# Patient Record
Sex: MALE | Race: WHITE | NOT HISPANIC OR LATINO | Employment: FULL TIME | URBAN - METROPOLITAN AREA
[De-identification: names, ages, dates, MRNs, and addresses within clinical notes are randomized per-mention and may not be internally consistent; named-entity substitution may affect disease eponyms.]

---

## 2018-10-30 ENCOUNTER — DOCUMENTATION (OUTPATIENT)
Dept: NEUROSURGERY | Facility: CLINIC | Age: 38
End: 2018-10-30

## 2018-10-31 DIAGNOSIS — M54.16 LUMBAR RADICULOPATHY: Primary | ICD-10-CM

## 2018-11-05 ENCOUNTER — TELEPHONE (OUTPATIENT)
Dept: NEUROSURGERY | Facility: CLINIC | Age: 38
End: 2018-11-05

## 2018-11-05 NOTE — TELEPHONE ENCOUNTER
Faxed patient's signed medical release of health information paper to [facility]'s medical records department to [phone] on 11/1/18  Called again today because we still have not received patient's records  Spoke with Lanie Willard who said that they did receive the fax on 11/1/18 and she is going to try to expedite the records so that we can obtain them by the end of the day today for patient's appt tomorrow  Awaiting fax

## 2018-11-06 ENCOUNTER — OFFICE VISIT (OUTPATIENT)
Dept: NEUROSURGERY | Facility: CLINIC | Age: 38
End: 2018-11-06
Payer: COMMERCIAL

## 2018-11-06 VITALS
WEIGHT: 195.8 LBS | RESPIRATION RATE: 16 BRPM | BODY MASS INDEX: 27.41 KG/M2 | SYSTOLIC BLOOD PRESSURE: 132 MMHG | HEIGHT: 71 IN | DIASTOLIC BLOOD PRESSURE: 80 MMHG | TEMPERATURE: 98.7 F

## 2018-11-06 DIAGNOSIS — Z98.890 STATUS POST LUMBAR DISCECTOMY: ICD-10-CM

## 2018-11-06 DIAGNOSIS — M54.16 LUMBAR RADICULOPATHY: Primary | ICD-10-CM

## 2018-11-06 PROCEDURE — 99244 OFF/OP CNSLTJ NEW/EST MOD 40: CPT | Performed by: NEUROLOGICAL SURGERY

## 2018-11-06 RX ORDER — METHYLPREDNISOLONE 4 MG/1
TABLET ORAL
Qty: 21 TABLET | Refills: 0 | Status: SHIPPED | OUTPATIENT
Start: 2018-11-06 | End: 2018-12-04 | Stop reason: ALTCHOICE

## 2018-11-06 RX ORDER — IBUPROFEN 200 MG
400 TABLET ORAL DAILY PRN
COMMUNITY
End: 2019-04-23

## 2018-11-06 NOTE — PROGRESS NOTES
Office Note - Neurosurgery   Shannan Simmons 45 y o  male MRN: [id number]      Assessment:    Patient is gradually improving  79-year-old gentleman with left leg pain consistent with radiculopathy  He has had previous back surgery  He will begin physical therapy for core strengthening and range of motion exercises  Will refer him to Dr Jerel Land to discuss additional pain management strategies  In the interim, prescribed Medrol Dosepak  Common side effects and interactions reviewed with the patient  I asked him not to take this medication with NSAIDs  He will follow-up in approximately 1 months time with MRI of the lumbar spine with and without contrast to discuss surgical options  He will contact my office should any concerns arise in the interim  History, physical examination and diagnostic tests were reviewed and questions answered  Diagnosis, care plan and treatment options were discussed  The patient understand instructions and will follow up as directed  Plan:    Follow-up: in 1 week(s)    Problem List Items Addressed This Visit        Other    Status post lumbar discectomy    Relevant Medications    Methylprednisolone 4 MG TBPK    Other Relevant Orders    MRI lumbar spine w wo contrast    Ambulatory referral to Physical Therapy    Ambulatory referral to Pain Management      Other Visit Diagnoses     Lumbar radiculopathy    -  Primary    Relevant Medications    Methylprednisolone 4 MG TBPK    Other Relevant Orders    MRI lumbar spine w wo contrast    Ambulatory referral to Physical Therapy    Ambulatory referral to Pain Management          Subjective/Objective     Chief Complaint    Left leg pain  HPI    79-year-old gentleman who underwent microdiskectomy in 2015 for left-sided leg pain  He had complete resolution of the symptoms afterwards    In June without inciting event he began to notice recurrent symptoms of pain radiating down the back of his left leg and into the outer foot  There is associated numbness but no weakness  This became worse in October  He denies any pain, weakness or numbness in his right leg  He denies any significant lower back pain or difficulties with bowel bladder function or changing perineal sensation  Extending his back seems to exacerbate the pain  Advil and Naprosyn have been helpful with the pain  He has not recently tried physical therapy, epidural steroid injection or any other pain medication  He presents today for initial evaluation  ROS    Review of Systems   HENT: Negative  Eyes: Negative  Respiratory: Negative  Cardiovascular: Negative  Gastrointestinal: Negative  Endocrine: Negative  Genitourinary: Negative  Musculoskeletal: Positive for arthralgias  Patient reports having pressure in his tailbone region since the surgery in 2015  Pain is mainly down the left leg, same as it was prior to surgery  Skin: Negative  Allergic/Immunologic: Negative  Neurological: Positive for weakness (However, patient reports that he is able to lift his b/l legs from a supine position which he could not do prior to his last surgery  )  Hematological: Negative  Psychiatric/Behavioral: Negative  Family History    Family History   Problem Relation Age of Onset    Lumbar disc disease Father        Social History    Social History     Social History    Marital status: /Civil Union     Spouse name: N/A    Number of children: N/A    Years of education: N/A     Occupational History    Not on file       Social History Main Topics    Smoking status: Never Smoker    Smokeless tobacco: Never Used    Alcohol use Yes    Drug use: No    Sexual activity: Yes     Other Topics Concern    Not on file     Social History Narrative    No narrative on file       Past Medical History    Past Medical History:   Diagnosis Date    Low back pain     Lumbosacral disc disease        Surgical History    Past Surgical History:   Procedure Laterality Date    LUMBAR SPINE SURGERY  10/07/2015    Dr Eileen Ocasio       Medications      Current Outpatient Prescriptions:     ibuprofen (MOTRIN) 200 mg tablet, Take 400 mg by mouth daily as needed  , Disp: , Rfl:     Methylprednisolone 4 MG TBPK, Use as directed on package, Disp: 21 tablet, Rfl: 0    Allergies    No Known Allergies    The following portions of the patient's history were reviewed and updated as appropriate: allergies, current medications, past family history, past medical history, past social history, past surgical history and problem list     Physical Exam    Vitals:  Blood pressure 132/80, temperature 98 7 °F (37 1 °C), resp  rate 16, height 5' 10 5" (1 791 m), weight 88 8 kg (195 lb 12 8 oz)  ,Body mass index is 27 7 kg/m²  Physical Exam   Constitutional: He appears well-developed and well-nourished  No distress  Musculoskeletal:   Full range of motion on flexion-extension of the lumbar spine without pain  Neurological:   5/5 power in lower extremities  Deep tendon reflexes 2+ at both patella but cannot be elicited at the Achilles  Reports normal light touch and pinprick sensation lower extremities  Walks with a steady gait  Romberg negative  Skin: Skin is warm and dry  Psychiatric: He has a normal mood and affect  His behavior is normal    Vitals reviewed      Neurologic Exam

## 2018-11-06 NOTE — LETTER
November 6, 2018     Carolina Gillespie MD  [address] [place] [address] [address] [address] [facility]    Patient: Surya Hoffman   YOB: 1980   Date of Visit: 11/6/2018       Dear Dr Vikas Harper: Thank you for referring Surya Hoffman to me for evaluation  Below are my notes for this consultation  If you have questions, please do not hesitate to call me  I look forward to following your patient along with you  Sincerely,        Sofia Jackson MD        CC: No Recipients  Sofia Jackson MD  11/6/2018  2:25 PM  Sign at close encounter  Office Note - Neurosurgery   Surya Hoffman 45 y o  male MRN: [id number]      Assessment:    Patient is gradually improving  77-year-old gentleman with left leg pain consistent with radiculopathy  He has had previous back surgery  He will begin physical therapy for core strengthening and range of motion exercises  Will refer him to Dr Cheyenne Up to discuss additional pain management strategies  In the interim, prescribed Medrol Dosepak  Common side effects and interactions reviewed with the patient  I asked him not to take this medication with NSAIDs  He will follow-up in approximately 1 months time with MRI of the lumbar spine with and without contrast to discuss surgical options  He will contact my office should any concerns arise in the interim  History, physical examination and diagnostic tests were reviewed and questions answered  Diagnosis, care plan and treatment options were discussed  The patient understand instructions and will follow up as directed      Plan:    Follow-up: in 1 week(s)    Problem List Items Addressed This Visit        Other    Status post lumbar discectomy    Relevant Medications    Methylprednisolone 4 MG TBPK    Other Relevant Orders    MRI lumbar spine w wo contrast    Ambulatory referral to Physical Therapy    Ambulatory referral to Pain Management      Other Visit Diagnoses     Lumbar radiculopathy    - Primary    Relevant Medications    Methylprednisolone 4 MG TBPK    Other Relevant Orders    MRI lumbar spine w wo contrast    Ambulatory referral to Physical Therapy    Ambulatory referral to Pain Management          Subjective/Objective     Chief Complaint    Left leg pain  HPI    68-year-old gentleman who underwent microdiskectomy in 2015 for left-sided leg pain  He had complete resolution of the symptoms afterwards  In June without inciting event he began to notice recurrent symptoms of pain radiating down the back of his left leg and into the outer foot  There is associated numbness but no weakness  This became worse in October  He denies any pain, weakness or numbness in his right leg  He denies any significant lower back pain or difficulties with bowel bladder function or changing perineal sensation  Extending his back seems to exacerbate the pain  Advil and Naprosyn have been helpful with the pain  He has not recently tried physical therapy, epidural steroid injection or any other pain medication  He presents today for initial evaluation  ROS    Review of Systems   HENT: Negative  Eyes: Negative  Respiratory: Negative  Cardiovascular: Negative  Gastrointestinal: Negative  Endocrine: Negative  Genitourinary: Negative  Musculoskeletal: Positive for arthralgias  Patient reports having pressure in his tailbone region since the surgery in 2015  Pain is mainly down the left leg, same as it was prior to surgery  Skin: Negative  Allergic/Immunologic: Negative  Neurological: Positive for weakness (However, patient reports that he is able to lift his b/l legs from a supine position which he could not do prior to his last surgery  )  Hematological: Negative  Psychiatric/Behavioral: Negative          Family History    Family History   Problem Relation Age of Onset    Lumbar disc disease Father        Social History    Social History     Social History    Marital status: /Civil Union     Spouse name: N/A    Number of children: N/A    Years of education: N/A     Occupational History    Not on file  Social History Main Topics    Smoking status: Never Smoker    Smokeless tobacco: Never Used    Alcohol use Yes    Drug use: No    Sexual activity: Yes     Other Topics Concern    Not on file     Social History Narrative    No narrative on file       Past Medical History    Past Medical History:   Diagnosis Date    Low back pain     Lumbosacral disc disease        Surgical History    Past Surgical History:   Procedure Laterality Date    LUMBAR SPINE SURGERY  10/07/2015    Dr Isadora Moffett       Medications      Current Outpatient Prescriptions:     ibuprofen (MOTRIN) 200 mg tablet, Take 400 mg by mouth daily as needed  , Disp: , Rfl:     Methylprednisolone 4 MG TBPK, Use as directed on package, Disp: 21 tablet, Rfl: 0    Allergies    No Known Allergies    The following portions of the patient's history were reviewed and updated as appropriate: allergies, current medications, past family history, past medical history, past social history, past surgical history and problem list     Physical Exam    Vitals:  Blood pressure 132/80, temperature 98 7 °F (37 1 °C), resp  rate 16, height 5' 10 5" (1 791 m), weight 88 8 kg (195 lb 12 8 oz)  ,Body mass index is 27 7 kg/m²  Physical Exam   Constitutional: He appears well-developed and well-nourished  No distress  Musculoskeletal:   Full range of motion on flexion-extension of the lumbar spine without pain  Neurological:   5/5 power in lower extremities  Deep tendon reflexes 2+ at both patella but cannot be elicited at the Achilles  Reports normal light touch and pinprick sensation lower extremities  Walks with a steady gait  Romberg negative  Skin: Skin is warm and dry  Psychiatric: He has a normal mood and affect  His behavior is normal    Vitals reviewed      Neurologic Exam

## 2018-11-09 ENCOUNTER — TELEPHONE (OUTPATIENT)
Dept: PAIN MEDICINE | Facility: CLINIC | Age: 38
End: 2018-11-09

## 2018-11-13 ENCOUNTER — TELEPHONE (OUTPATIENT)
Dept: PAIN MEDICINE | Facility: CLINIC | Age: 38
End: 2018-11-13

## 2018-11-26 ENCOUNTER — HOSPITAL ENCOUNTER (OUTPATIENT)
Dept: MRI IMAGING | Facility: CLINIC | Age: 38
Discharge: HOME | End: 2018-11-26
Payer: COMMERCIAL

## 2018-11-26 DIAGNOSIS — Z98.890 STATUS POST LUMBAR DISCECTOMY: ICD-10-CM

## 2018-11-26 DIAGNOSIS — M54.16 LUMBAR RADICULOPATHY: ICD-10-CM

## 2018-11-26 PROCEDURE — A9585 GADOBUTROL INJECTION: HCPCS | Performed by: NEUROLOGICAL SURGERY

## 2018-11-26 PROCEDURE — 72158 MRI LUMBAR SPINE W/O & W/DYE: CPT

## 2018-11-26 RX ADMIN — GADOBUTROL 9 ML: 604.72 INJECTION INTRAVENOUS at 16:08

## 2018-12-04 ENCOUNTER — OFFICE VISIT (OUTPATIENT)
Dept: NEUROSURGERY | Facility: CLINIC | Age: 38
End: 2018-12-04
Payer: COMMERCIAL

## 2018-12-04 VITALS
RESPIRATION RATE: 18 BRPM | TEMPERATURE: 96.7 F | DIASTOLIC BLOOD PRESSURE: 86 MMHG | WEIGHT: 193.2 LBS | HEART RATE: 70 BPM | HEIGHT: 71 IN | BODY MASS INDEX: 27.05 KG/M2 | SYSTOLIC BLOOD PRESSURE: 138 MMHG

## 2018-12-04 DIAGNOSIS — M54.16 LUMBAR RADICULOPATHY: ICD-10-CM

## 2018-12-04 DIAGNOSIS — Z98.890 STATUS POST LUMBAR DISCECTOMY: Primary | ICD-10-CM

## 2018-12-04 PROCEDURE — 99213 OFFICE O/P EST LOW 20 MIN: CPT | Performed by: NEUROLOGICAL SURGERY

## 2018-12-04 NOTE — PROGRESS NOTES
Office Note - Neurosurgery   Maty Weaver 45 y o  male MRN: [id number]      Assessment:    Patient is gradually improving  60-year-old gentleman with persistent left lumbar radiculopathy  Up-to-date MRI demonstrates small recurrent right-sided disc herniation at L5-S1 where he previously underwent decompression  On the left there is mild left lateral recess stenosis secondary to degenerative changes  I explained that further surgical intervention on the right would not be expected to improve his left-sided leg pain  While he minimally invasive laminal foraminotomy could be considered on the left at L5-S1, would recommend he exhausted nonsurgical pain management strategy  He will discuss a course of pain medication including membrane stabilizing agents with his PCP  He will also arrange for an appointment with Dr Reymundo Apley to discuss injections  I would be pleased to see him again to discuss surgical intervention if his symptoms persist after exhausting nonsurgical pain management strategies  Otherwise, he will follow up on a p r n  basis  History, physical examination and diagnostic tests were reviewed and questions answered  Diagnosis, care plan and treatment options were discussed  The patient understand instructions and will follow up as directed  Plan:    Follow-up: prn    Problem List Items Addressed This Visit        Nervous and Auditory    Lumbar radiculopathy       Other    Status post lumbar discectomy - Primary          Subjective/Objective     Chief Complaint    Left leg pain  HPI    Since his last visit, the patient is noted some improvement with physical therapy for core strengthening and range of motion exercises  He continues to have a jolting sensation down his left leg with Valsalva  He denies any new pain, weakness or numbness in his legs or difficulties with bowel bladder function or changing perineal sensation    Medrol Alexey Ambrose was somewhat helpful  He would continues to take ibuprofen and is not changed his medication regimen  He did not follow-up with pain management as discussed  He presents today to review the results of recent MRI of the lumbar spine  ROS    Review of Systems   HENT: Negative  Eyes: Negative  Respiratory: Negative  Cardiovascular: Negative  Gastrointestinal: Negative  Endocrine: Negative  Genitourinary: Negative  Musculoskeletal: Positive for arthralgias  Negative for back pain (left leg )  Patient reports having pressure in his tailbone region since the surgery in 2015  Pain is mainly down the left leg, same as it was prior to surgery  Skin: Negative  Allergic/Immunologic: Negative  Neurological: Positive for weakness and numbness (left foot)  Hematological: Negative  Psychiatric/Behavioral: Negative  Family History    Family History   Problem Relation Age of Onset    Lumbar disc disease Father        Social History    Social History     Social History    Marital status: /Civil Union     Spouse name: N/A    Number of children: N/A    Years of education: N/A     Occupational History    Not on file       Social History Main Topics    Smoking status: Never Smoker    Smokeless tobacco: Never Used    Alcohol use Yes    Drug use: No    Sexual activity: Yes     Other Topics Concern    Not on file     Social History Narrative    No narrative on file       Past Medical History    Past Medical History:   Diagnosis Date    Low back pain     Lumbosacral disc disease        Surgical History    Past Surgical History:   Procedure Laterality Date    LUMBAR SPINE SURGERY  10/07/2015    Dr Ernesto Dumas       Medications      Current Outpatient Prescriptions:     ibuprofen (MOTRIN) 200 mg tablet, Take 400 mg by mouth daily as needed  , Disp: , Rfl:     Allergies    No Known Allergies    The following portions of the patient's history were reviewed and updated as appropriate: allergies, current medications, past family history, past medical history, past social history, past surgical history and problem list     Investigations    I personally reviewed the MRI results with the patient:    MRI of the lumbar spine with and without contrast dated November 26, 2018  Normal lumbar lordosis  Degenerative disc disease at L5-S1  Previous right-sided laminotomy with epidural scarring and small right disc herniation  Mild left lateral recess stenosis secondary to degenerative changes at L5-S1  No other areas of significant neural compression  Intrathecal contents unremarkable  Physical Exam    Vitals:  Blood pressure 138/86, pulse 70, temperature (!) 96 7 °F (35 9 °C), resp  rate 18, height 5' 10 5" (1 791 m), weight 87 6 kg (193 lb 3 2 oz)  ,Body mass index is 27 33 kg/m²  Physical Exam   Constitutional: He is oriented to person, place, and time  He appears well-developed and well-nourished  No distress  Neurological: He is alert and oriented to person, place, and time  Walks with a normal gait  5/5 power on dorsiflexion and plantar flexion bilaterally  Skin: Skin is warm and dry  Psychiatric: He has a normal mood and affect  His behavior is normal    Vitals reviewed  Neurologic Exam     Mental Status   Oriented to person, place, and time

## 2018-12-04 NOTE — LETTER
December 4, 2018     Quincy Ribeiro MD  [address] [place] [address] [address]    Patient: Sana Ibanez   YOB: 1980   Date of Visit: 12/4/2018       Dear Dr Rah Ha: Thank you for referring Sana Ibanez to me for evaluation  Below are my notes for this consultation  If you have questions, please do not hesitate to call me  I look forward to following your patient along with you  Sincerely,        Jabari Langston MD        CC: No Recipients  Jabari Langston MD  12/4/2018  9:12 AM  Sign at close encounter  Office Note - Neurosurgery   Sana Ibanez 45 y o  male MRN: [id number]      Assessment:    Patient is gradually improving  40-year-old gentleman with persistent left lumbar radiculopathy  Up-to-date MRI demonstrates small recurrent right-sided disc herniation at L5-S1 where he previously underwent decompression  On the left there is mild left lateral recess stenosis secondary to degenerative changes  I explained that further surgical intervention on the right would not be expected to improve his left-sided leg pain  While he minimally invasive laminal foraminotomy could be considered on the left at L5-S1, would recommend he exhausted nonsurgical pain management strategy  He will discuss a course of pain medication including membrane stabilizing agents with his PCP  He will also arrange for an appointment with Dr David Hutchinson to discuss injections  I would be pleased to see him again to discuss surgical intervention if his symptoms persist after exhausting nonsurgical pain management strategies  Otherwise, he will follow up on a p r n  basis  History, physical examination and diagnostic tests were reviewed and questions answered  Diagnosis, care plan and treatment options were discussed  The patient understand instructions and will follow up as directed      Plan:    Follow-up: prn    Problem List Items Addressed This Visit        Nervous and Auditory    Lumbar radiculopathy       Other    Status post lumbar discectomy - Primary          Subjective/Objective     Chief Complaint    Left leg pain  HPI    Since his last visit, the patient is noted some improvement with physical therapy for core strengthening and range of motion exercises  He continues to have a jolting sensation down his left leg with Valsalva  He denies any new pain, weakness or numbness in his legs or difficulties with bowel bladder function or changing perineal sensation  Medrol Dosepak was somewhat helpful  He would continues to take ibuprofen and is not changed his medication regimen  He did not follow-up with pain management as discussed  He presents today to review the results of recent MRI of the lumbar spine  ROS    Review of Systems   HENT: Negative  Eyes: Negative  Respiratory: Negative  Cardiovascular: Negative  Gastrointestinal: Negative  Endocrine: Negative  Genitourinary: Negative  Musculoskeletal: Positive for arthralgias  Negative for back pain (left leg )  Patient reports having pressure in his tailbone region since the surgery in 2015  Pain is mainly down the left leg, same as it was prior to surgery  Skin: Negative  Allergic/Immunologic: Negative  Neurological: Positive for weakness and numbness (left foot)  Hematological: Negative  Psychiatric/Behavioral: Negative  Family History    Family History   Problem Relation Age of Onset    Lumbar disc disease Father        Social History    Social History     Social History    Marital status: /Civil Union     Spouse name: N/A    Number of children: N/A    Years of education: N/A     Occupational History    Not on file       Social History Main Topics    Smoking status: Never Smoker    Smokeless tobacco: Never Used    Alcohol use Yes    Drug use: No    Sexual activity: Yes     Other Topics Concern    Not on file     Social History Narrative    No narrative on file       Past Medical History    Past Medical History:   Diagnosis Date    Low back pain     Lumbosacral disc disease        Surgical History    Past Surgical History:   Procedure Laterality Date    LUMBAR SPINE SURGERY  10/07/2015    Dr Ernestine Gonzalez       Medications      Current Outpatient Prescriptions:     ibuprofen (MOTRIN) 200 mg tablet, Take 400 mg by mouth daily as needed  , Disp: , Rfl:     Allergies    No Known Allergies    The following portions of the patient's history were reviewed and updated as appropriate: allergies, current medications, past family history, past medical history, past social history, past surgical history and problem list     Investigations    I personally reviewed the MRI results with the patient:    MRI of the lumbar spine with and without contrast dated November 26, 2018  Normal lumbar lordosis  Degenerative disc disease at L5-S1  Previous right-sided laminotomy with epidural scarring and small right disc herniation  Mild left lateral recess stenosis secondary to degenerative changes at L5-S1  No other areas of significant neural compression  Intrathecal contents unremarkable  Physical Exam    Vitals:  Blood pressure 138/86, pulse 70, temperature (!) 96 7 °F (35 9 °C), resp  rate 18, height 5' 10 5" (1 791 m), weight 87 6 kg (193 lb 3 2 oz)  ,Body mass index is 27 33 kg/m²  Physical Exam   Constitutional: He is oriented to person, place, and time  He appears well-developed and well-nourished  No distress  Neurological: He is alert and oriented to person, place, and time  Walks with a normal gait  5/5 power on dorsiflexion and plantar flexion bilaterally  Skin: Skin is warm and dry  Psychiatric: He has a normal mood and affect  His behavior is normal    Vitals reviewed  Neurologic Exam     Mental Status   Oriented to person, place, and time

## 2019-01-09 ENCOUNTER — OFFICE VISIT (OUTPATIENT)
Dept: PAIN MEDICINE | Facility: CLINIC | Age: 39
End: 2019-01-09
Payer: COMMERCIAL

## 2019-01-09 VITALS
WEIGHT: 193 LBS | HEIGHT: 71 IN | DIASTOLIC BLOOD PRESSURE: 76 MMHG | RESPIRATION RATE: 18 BRPM | BODY MASS INDEX: 27.02 KG/M2 | HEART RATE: 77 BPM | SYSTOLIC BLOOD PRESSURE: 134 MMHG

## 2019-01-09 DIAGNOSIS — G89.4 CHRONIC PAIN SYNDROME: ICD-10-CM

## 2019-01-09 DIAGNOSIS — Z98.890 STATUS POST LUMBAR DISCECTOMY: ICD-10-CM

## 2019-01-09 DIAGNOSIS — M54.16 LUMBAR RADICULOPATHY: Primary | ICD-10-CM

## 2019-01-09 PROCEDURE — 99244 OFF/OP CNSLTJ NEW/EST MOD 40: CPT | Performed by: ANESTHESIOLOGY

## 2019-01-09 NOTE — PROGRESS NOTES
Assessment:  1  Lumbar radiculopathy  Ambulatory referral to Pain Management    [place] spine and pain procedure   2  Status post lumbar discectomy  Ambulatory referral to Pain Management   3  Chronic pain syndrome       Plan: This is a 72-year-old male who presents today for initial consultation for management of low back pain and left lower extremity radicular pain  Patient has a diagnosis of lumbar radiculopathy  Patient has had prior lumbar laminectomy  MRI demonstrates recurrent disc herniation at L5-S1  Low back pain radiates down the lateral aspect of the left foot  The patient's pain persists despite time, relative rest, activity modification and Physical therapy  I recommend a [LEFT L5, S1] transforaminal epidural steroid injection to diminish any inflammatory component of the pain  We will initially use a transforaminal approach to better concentrate the steroid along the affected nerve root  The injection may need to be repeated based on the degree of pain relief following the initial injection  In the office today, we reviewed the nature of the patient's pathology in depth using diagrams and models  We discussed the approach we would use for the epidural steroid injection and provided literature for home review  The patient understands the risks associated with the procedure including bleeding, infection, tissue injury, allergic reaction and paralysis and provided written and verbal consent in the office today  My impressions and treatment recommendations were discussed in detail with the patient who verbalized understanding and had no further questions  Discharge instructions were provided  I personally saw and examined the patient and I agree with the above discussed plan of care  History of Present Illness:    Francois Subramanian is a 45 y o  male who presents with low back pain and left leg pain  Patient states that he has had surgery 3 years ago   He states that recently he has had ongoing low back pain and left leg pain  He states that he has completed PT  Patient has had prior chiropractic manipulation  He states that he saw Dr Esequiel Pope and that he was considering surgery again but he would like to wait and try conservative management  He states that he has had the injections in the past prior to surgery  He states that he would like to try the shots again to se if it helps  He states that when he takes ibuprofen it cuts the pain down  Today, patient doses moderate pain which he rates 7/10 on the pain scale  Pain is nearly constant, worse in the evening  Patient further describes pain as burning, shooting with numbness, sharp and dull and aching sensation down the left lower extremity  His pain is aggravated with prolonged bending, sitting, and exercise  Patient reports prior nerve block with good relief of pain  PT has helped he does exercise on a regular basis  He denies leg weakness down the leg  He states that his pain is mostly tingling and numbness especially with coughing and sneezing  I have personally reviewed and/or updated the patient's past medical history, past surgical history, family history, social history, current medications, allergies, and vital signs today  Review of Systems:    Review of Systems   Constitutional: Negative for fever and unexpected weight change  HENT: Negative for trouble swallowing  Eyes: Negative for visual disturbance  Respiratory: Negative for shortness of breath and wheezing  Cardiovascular: Negative for chest pain and palpitations  Gastrointestinal: Negative for constipation, diarrhea, nausea and vomiting  Endocrine: Negative for cold intolerance, heat intolerance and polydipsia  Genitourinary: Negative for difficulty urinating and frequency  Musculoskeletal: Negative for arthralgias, gait problem, joint swelling and myalgias  Decreased ROM   Skin: Negative for rash     Neurological: Negative for dizziness, seizures, syncope, weakness and headaches  Hematological: Does not bruise/bleed easily  Psychiatric/Behavioral: Negative for dysphoric mood  All other systems reviewed and are negative  Patient Active Problem List   Diagnosis    Status post lumbar discectomy    Lumbar radiculopathy       Past Medical History:   Diagnosis Date    Low back pain     Lumbosacral disc disease        Past Surgical History:   Procedure Laterality Date    LUMBAR SPINE SURGERY  10/07/2015    Dr Roberts Owen History   Problem Relation Age of Onset    Lumbar disc disease Father        Social History     Occupational History    Not on file  Social History Main Topics    Smoking status: Never Smoker    Smokeless tobacco: Never Used    Alcohol use Yes    Drug use: No    Sexual activity: Yes       Current Outpatient Prescriptions on File Prior to Visit   Medication Sig    ibuprofen (MOTRIN) 200 mg tablet Take 400 mg by mouth daily as needed       No current facility-administered medications on file prior to visit  No Known Allergies    Physical Exam:    /76   Pulse 77   Resp 18   Ht 5' 10 5" (1 791 m)   Wt 87 5 kg (193 lb)   BMI 27 30 kg/m²     Constitutional: normal, well developed, well nourished, alert, in no distress and non-toxic and no overt pain behavior    Eyes: anicteric  HEENT: grossly intact  Neck: supple, symmetric, trachea midline and no masses   Pulmonary:even and unlabored  Cardiovascular:No edema or pitting edema present  Skin:Normal without rashes or lesions and well hydrated  Psychiatric:Mood and affect appropriate  Neurologic:Cranial Nerves II-XII grossly intact  Musculoskeletal:normal    Lumbar Spine Exam    Appearance:  Normal lordosis  Palpation/Tenderness:  left lumbar paraspinal tenderness  Sensory:  no sensory deficits noted  Range of Motion:  Extension:  Minimally limited  without pain  Motor Strength:  Left foot dorsiflexion:  5/5  Left foot plantar flexion:  5/5  Right foot dorsiflexion:  5/5  Right foot plantar flexion:  5/5  Reflexes:  Left Patellar:  2+   Right Patellar:  2+   Special Tests:  Left Straight Leg Test:  negative  Right Straight Leg Test:  negative    Imaging

## 2019-01-15 ENCOUNTER — HOSPITAL ENCOUNTER (OUTPATIENT)
Dept: RADIOLOGY | Facility: CLINIC | Age: 39
Discharge: HOME | End: 2019-01-15
Attending: ANESTHESIOLOGY | Admitting: ANESTHESIOLOGY
Payer: COMMERCIAL

## 2019-01-15 VITALS
RESPIRATION RATE: 18 BRPM | DIASTOLIC BLOOD PRESSURE: 83 MMHG | HEART RATE: 92 BPM | OXYGEN SATURATION: 97 % | TEMPERATURE: 98.5 F | SYSTOLIC BLOOD PRESSURE: 133 MMHG

## 2019-01-15 DIAGNOSIS — M54.16 LUMBAR RADICULOPATHY: ICD-10-CM

## 2019-01-15 PROCEDURE — 64484 NJX AA&/STRD TFRM EPI L/S EA: CPT | Performed by: ANESTHESIOLOGY

## 2019-01-15 PROCEDURE — 64483 NJX AA&/STRD TFRM EPI L/S 1: CPT | Performed by: ANESTHESIOLOGY

## 2019-01-15 RX ORDER — BUPIVACAINE HCL/PF 2.5 MG/ML
5 VIAL (ML) INJECTION ONCE
Status: DISCONTINUED | OUTPATIENT
Start: 2019-01-15 | End: 2019-01-19 | Stop reason: HOSPADM

## 2019-01-15 RX ORDER — LIDOCAINE HYDROCHLORIDE 10 MG/ML
5 INJECTION, SOLUTION EPIDURAL; INFILTRATION; INTRACAUDAL; PERINEURAL ONCE
Status: DISCONTINUED | OUTPATIENT
Start: 2019-01-15 | End: 2019-01-19 | Stop reason: HOSPADM

## 2019-01-15 RX ORDER — PAPAVERINE HCL 150 MG
20 CAPSULE, EXTENDED RELEASE ORAL ONCE
Status: DISCONTINUED | OUTPATIENT
Start: 2019-01-15 | End: 2019-01-19 | Stop reason: HOSPADM

## 2019-01-15 NOTE — INTERVAL H&P NOTE
Update: (This section must be completed if the H&P was completed greater than 24 hrs to procedure or admission)    H&P reviewed  After examining the patient, I find no changed to the H&P since it had been written  Patient re-evaluated   Accept as history and physical     Zoya Epperson MD/January 15, 2019/1:50 PM

## 2019-01-15 NOTE — H&P (VIEW-ONLY)
Assessment:  1  Lumbar radiculopathy  Ambulatory referral to Pain Management    [place] spine and pain procedure   2  Status post lumbar discectomy  Ambulatory referral to Pain Management   3  Chronic pain syndrome       Plan: This is a 70-year-old male who presents today for initial consultation for management of low back pain and left lower extremity radicular pain  Patient has a diagnosis of lumbar radiculopathy  Patient has had prior lumbar laminectomy  MRI demonstrates recurrent disc herniation at L5-S1  Low back pain radiates down the lateral aspect of the left foot  The patient's pain persists despite time, relative rest, activity modification and Physical therapy  I recommend a [LEFT L5, S1] transforaminal epidural steroid injection to diminish any inflammatory component of the pain  We will initially use a transforaminal approach to better concentrate the steroid along the affected nerve root  The injection may need to be repeated based on the degree of pain relief following the initial injection  In the office today, we reviewed the nature of the patient's pathology in depth using diagrams and models  We discussed the approach we would use for the epidural steroid injection and provided literature for home review  The patient understands the risks associated with the procedure including bleeding, infection, tissue injury, allergic reaction and paralysis and provided written and verbal consent in the office today  My impressions and treatment recommendations were discussed in detail with the patient who verbalized understanding and had no further questions  Discharge instructions were provided  I personally saw and examined the patient and I agree with the above discussed plan of care  History of Present Illness:    Blondell Bamberger is a 45 y o  male who presents with low back pain and left leg pain  Patient states that he has had surgery 3 years ago   He states that recently he has had ongoing low back pain and left leg pain  He states that he has completed PT  Patient has had prior chiropractic manipulation  He states that he saw Dr Tiarra Vidal and that he was considering surgery again but he would like to wait and try conservative management  He states that he has had the injections in the past prior to surgery  He states that he would like to try the shots again to se if it helps  He states that when he takes ibuprofen it cuts the pain down  Today, patient doses moderate pain which he rates 7/10 on the pain scale  Pain is nearly constant, worse in the evening  Patient further describes pain as burning, shooting with numbness, sharp and dull and aching sensation down the left lower extremity  His pain is aggravated with prolonged bending, sitting, and exercise  Patient reports prior nerve block with good relief of pain  PT has helped he does exercise on a regular basis  He denies leg weakness down the leg  He states that his pain is mostly tingling and numbness especially with coughing and sneezing  I have personally reviewed and/or updated the patient's past medical history, past surgical history, family history, social history, current medications, allergies, and vital signs today  Review of Systems:    Review of Systems   Constitutional: Negative for fever and unexpected weight change  HENT: Negative for trouble swallowing  Eyes: Negative for visual disturbance  Respiratory: Negative for shortness of breath and wheezing  Cardiovascular: Negative for chest pain and palpitations  Gastrointestinal: Negative for constipation, diarrhea, nausea and vomiting  Endocrine: Negative for cold intolerance, heat intolerance and polydipsia  Genitourinary: Negative for difficulty urinating and frequency  Musculoskeletal: Negative for arthralgias, gait problem, joint swelling and myalgias  Decreased ROM   Skin: Negative for rash     Neurological: Negative for dizziness, seizures, syncope, weakness and headaches  Hematological: Does not bruise/bleed easily  Psychiatric/Behavioral: Negative for dysphoric mood  All other systems reviewed and are negative  Patient Active Problem List   Diagnosis    Status post lumbar discectomy    Lumbar radiculopathy       Past Medical History:   Diagnosis Date    Low back pain     Lumbosacral disc disease        Past Surgical History:   Procedure Laterality Date    LUMBAR SPINE SURGERY  10/07/2015    Dr Tracy Duran History   Problem Relation Age of Onset    Lumbar disc disease Father        Social History     Occupational History    Not on file  Social History Main Topics    Smoking status: Never Smoker    Smokeless tobacco: Never Used    Alcohol use Yes    Drug use: No    Sexual activity: Yes       Current Outpatient Prescriptions on File Prior to Visit   Medication Sig    ibuprofen (MOTRIN) 200 mg tablet Take 400 mg by mouth daily as needed       No current facility-administered medications on file prior to visit  No Known Allergies    Physical Exam:    /76   Pulse 77   Resp 18   Ht 5' 10 5" (1 791 m)   Wt 87 5 kg (193 lb)   BMI 27 30 kg/m²     Constitutional: normal, well developed, well nourished, alert, in no distress and non-toxic and no overt pain behavior    Eyes: anicteric  HEENT: grossly intact  Neck: supple, symmetric, trachea midline and no masses   Pulmonary:even and unlabored  Cardiovascular:No edema or pitting edema present  Skin:Normal without rashes or lesions and well hydrated  Psychiatric:Mood and affect appropriate  Neurologic:Cranial Nerves II-XII grossly intact  Musculoskeletal:normal    Lumbar Spine Exam    Appearance:  Normal lordosis  Palpation/Tenderness:  left lumbar paraspinal tenderness  Sensory:  no sensory deficits noted  Range of Motion:  Extension:  Minimally limited  without pain  Motor Strength:  Left foot dorsiflexion:  5/5  Left foot plantar flexion:  5/5  Right foot dorsiflexion:  5/5  Right foot plantar flexion:  5/5  Reflexes:  Left Patellar:  2+   Right Patellar:  2+   Special Tests:  Left Straight Leg Test:  negative  Right Straight Leg Test:  negative    Imaging

## 2019-01-15 NOTE — DISCHARGE INSTR - LAB
Epidural Steroid Injection   WHAT YOU NEED TO KNOW:   An epidural steroid injection (VICKEY) is a procedure to inject steroid medicine into the epidural space  The epidural space is between your spinal cord and vertebrae  Steroids reduce inflammation and fluid buildup in your spine that may be causing pain  You may be given pain medicine along with the steroids  ACTIVITY  · Do not drive or operate machinery today  · No strenuous activity today - bending, lifting, etc   · You may resume normal activites starting tomorrow - start slowly and as tolerated  · You may shower today, but no tub baths or hot tubs  · You may have numbness for several hours from the local anesthetic  Please use caution and common sense, especially with weight-bearing activities  CARE OF THE INJECTION SITE  · If you have soreness or pain, apply ice to the area today (20 minutes on/20 minutes off)  · Starting tomorrow, you may use warm, moist heat or ice if needed  · You may have an increase or change in your discomfort for 36-48 hours after your treatment  · Apply ice and continue with any pain medication you have been prescribed  · Notify [facility] if you have any of the following: redness, drainage, swelling, headache, stiff neck or fever above 100°F     SPECIAL INSTRUCTIONS  · Our office will contact you in approximately 7 days for a progress report  MEDICATIONS  · Continue to take all routine medications  · Our office may have instructed you to hold some medications  If you have a problem specifically related to your procedure, please call our office at [phone]  Problems not related to your procedure should be directed to your primary care physician

## 2019-01-22 ENCOUNTER — TELEPHONE (OUTPATIENT)
Dept: PAIN MEDICINE | Facility: CLINIC | Age: 39
End: 2019-01-22

## 2019-01-22 NOTE — TELEPHONE ENCOUNTER
Pt reports only 75% improvement until yesterday   Pt said his pain is back to how it was before inj    Pain level now 4/10 it does get worse later in the day

## 2019-02-11 ENCOUNTER — TELEPHONE (OUTPATIENT)
Dept: NEUROSURGERY | Facility: CLINIC | Age: 39
End: 2019-02-11

## 2019-02-11 NOTE — TELEPHONE ENCOUNTER
Patient [facility] requesting call back regarding moving his appt with Dr Fatou Orlando up sooner  He is scheduled to come in on 3/5/19 but his insurance runs out at the end of march and the patient is interested in scheduling surgery  LMOM for patient to call back at his convenience  Seeing if he is able to come in with Dr Fatou Orlando this Friday at [facility]

## 2019-02-15 ENCOUNTER — APPOINTMENT (OUTPATIENT)
Dept: LAB | Facility: CLINIC | Age: 39
End: 2019-02-15
Payer: COMMERCIAL

## 2019-02-15 ENCOUNTER — OFFICE VISIT (OUTPATIENT)
Dept: NEUROSURGERY | Facility: CLINIC | Age: 39
End: 2019-02-15
Payer: COMMERCIAL

## 2019-02-15 VITALS
SYSTOLIC BLOOD PRESSURE: 141 MMHG | RESPIRATION RATE: 18 BRPM | HEART RATE: 74 BPM | TEMPERATURE: 98.1 F | BODY MASS INDEX: 27.27 KG/M2 | DIASTOLIC BLOOD PRESSURE: 88 MMHG | WEIGHT: 194.8 LBS | HEIGHT: 71 IN

## 2019-02-15 DIAGNOSIS — Z98.890 STATUS POST LUMBAR DISCECTOMY: ICD-10-CM

## 2019-02-15 DIAGNOSIS — Z01.818 PRE-PROCEDURAL EXAMINATION: ICD-10-CM

## 2019-02-15 DIAGNOSIS — M54.16 LUMBAR RADICULOPATHY: Primary | ICD-10-CM

## 2019-02-15 DIAGNOSIS — M54.16 LUMBAR RADICULOPATHY: ICD-10-CM

## 2019-02-15 PROBLEM — M47.816 LUMBAR SPONDYLOSIS: Status: ACTIVE | Noted: 2019-02-15

## 2019-02-15 LAB
ALBUMIN SERPL BCP-MCNC: 4.2 G/DL (ref 3.5–5)
ALP SERPL-CCNC: 57 U/L (ref 46–116)
ALT SERPL W P-5'-P-CCNC: 43 U/L (ref 12–78)
ANION GAP SERPL CALCULATED.3IONS-SCNC: 10 MMOL/L (ref 4–13)
APTT PPP: 33 SECONDS (ref 26–38)
AST SERPL W P-5'-P-CCNC: 22 U/L (ref 5–45)
BASOPHILS # BLD AUTO: 0.03 THOUSANDS/ΜL (ref 0–0.1)
BASOPHILS NFR BLD AUTO: 1 % (ref 0–1)
BILIRUB SERPL-MCNC: 0.5 MG/DL (ref 0.2–1)
BILIRUB UR QL STRIP: NEGATIVE
BUN SERPL-MCNC: 14 MG/DL (ref 5–25)
CALCIUM SERPL-MCNC: 9.2 MG/DL (ref 8.3–10.1)
CHLORIDE SERPL-SCNC: 103 MMOL/L (ref 100–108)
CLARITY UR: CLEAR
CO2 SERPL-SCNC: 27 MMOL/L (ref 21–32)
COLOR UR: YELLOW
CREAT SERPL-MCNC: 0.88 MG/DL (ref 0.6–1.3)
EOSINOPHIL # BLD AUTO: 0.05 THOUSAND/ΜL (ref 0–0.61)
EOSINOPHIL NFR BLD AUTO: 1 % (ref 0–6)
ERYTHROCYTE [DISTWIDTH] IN BLOOD BY AUTOMATED COUNT: 12.2 % (ref 11.6–15.1)
EST. AVERAGE GLUCOSE BLD GHB EST-MCNC: 114 MG/DL
GFR SERPL CREATININE-BSD FRML MDRD: 109 ML/MIN/1.73SQ M
GLUCOSE SERPL-MCNC: 99 MG/DL (ref 65–140)
GLUCOSE UR STRIP-MCNC: NEGATIVE MG/DL
HBA1C MFR BLD: 5.6 % (ref 4.2–6.3)
HCT VFR BLD AUTO: 46.2 % (ref 36.5–49.3)
HGB BLD-MCNC: 15.7 G/DL (ref 12–17)
HGB UR QL STRIP.AUTO: NEGATIVE
IMM GRANULOCYTES # BLD AUTO: 0.02 THOUSAND/UL (ref 0–0.2)
IMM GRANULOCYTES NFR BLD AUTO: 0 % (ref 0–2)
INR PPP: 1 (ref 0.86–1.17)
KETONES UR STRIP-MCNC: NEGATIVE MG/DL
LEUKOCYTE ESTERASE UR QL STRIP: NEGATIVE
LYMPHOCYTES # BLD AUTO: 2.77 THOUSANDS/ΜL (ref 0.6–4.47)
LYMPHOCYTES NFR BLD AUTO: 51 % (ref 14–44)
MCH RBC QN AUTO: 31.5 PG (ref 26.8–34.3)
MCHC RBC AUTO-ENTMCNC: 34 G/DL (ref 31.4–37.4)
MCV RBC AUTO: 93 FL (ref 82–98)
MONOCYTES # BLD AUTO: 0.36 THOUSAND/ΜL (ref 0.17–1.22)
MONOCYTES NFR BLD AUTO: 7 % (ref 4–12)
NEUTROPHILS # BLD AUTO: 2.16 THOUSANDS/ΜL (ref 1.85–7.62)
NEUTS SEG NFR BLD AUTO: 40 % (ref 43–75)
NITRITE UR QL STRIP: NEGATIVE
NRBC BLD AUTO-RTO: 0 /100 WBCS
PH UR STRIP.AUTO: 6.5 [PH] (ref 4.5–8)
PLATELET # BLD AUTO: 226 THOUSANDS/UL (ref 149–390)
PMV BLD AUTO: 11.1 FL (ref 8.9–12.7)
POTASSIUM SERPL-SCNC: 4 MMOL/L (ref 3.5–5.3)
PROT SERPL-MCNC: 7.5 G/DL (ref 6.4–8.2)
PROT UR STRIP-MCNC: NEGATIVE MG/DL
PROTHROMBIN TIME: 12.9 SECONDS (ref 11.8–14.2)
RBC # BLD AUTO: 4.99 MILLION/UL (ref 3.88–5.62)
SODIUM SERPL-SCNC: 140 MMOL/L (ref 136–145)
SP GR UR STRIP.AUTO: <=1.005 (ref 1–1.03)
UROBILINOGEN UR QL STRIP.AUTO: 0.2 E.U./DL
WBC # BLD AUTO: 5.39 THOUSAND/UL (ref 4.31–10.16)

## 2019-02-15 PROCEDURE — 85025 COMPLETE CBC W/AUTO DIFF WBC: CPT

## 2019-02-15 PROCEDURE — 81003 URINALYSIS AUTO W/O SCOPE: CPT | Performed by: NEUROLOGICAL SURGERY

## 2019-02-15 PROCEDURE — 85610 PROTHROMBIN TIME: CPT

## 2019-02-15 PROCEDURE — 36415 COLL VENOUS BLD VENIPUNCTURE: CPT

## 2019-02-15 PROCEDURE — 85730 THROMBOPLASTIN TIME PARTIAL: CPT

## 2019-02-15 PROCEDURE — 99214 OFFICE O/P EST MOD 30 MIN: CPT | Performed by: NEUROLOGICAL SURGERY

## 2019-02-15 PROCEDURE — 83036 HEMOGLOBIN GLYCOSYLATED A1C: CPT

## 2019-02-15 PROCEDURE — 80053 COMPREHEN METABOLIC PANEL: CPT

## 2019-02-15 RX ORDER — CEFAZOLIN SODIUM 2 G/50ML
2000 SOLUTION INTRAVENOUS ONCE
Status: CANCELLED | OUTPATIENT
Start: 2019-02-15 | End: 2019-02-15

## 2019-02-15 RX ORDER — CHLORHEXIDINE GLUCONATE 0.12 MG/ML
15 RINSE ORAL ONCE
Status: CANCELLED | OUTPATIENT
Start: 2019-02-15 | End: 2019-02-15

## 2019-02-15 NOTE — H&P (VIEW-ONLY)
Office Note - Neurosurgery   Ivan Him 45 y o  male MRN: [id number]      Assessment:    Patient is stable  51-year-old gentleman with persistent left lumbar radiculopathy secondary to lateral recess stenosis at L5-S1  Epidural steroid injection provided minimal improvement in his symptoms  These continue impact on his quality of life in daily activities  He is a candidate for left L5-S1 minimally invasive laminal foraminotomy and possible microdiskectomy with possible epidural steroid injection  The goal of surgery is to relieve pressure neural structures and hopefully improve radicular pain and symptoms of neurogenic claudication  Weakness, numbness and back pain are less likely to improve  The risks of surgery were described in detail  1  Risk of general anesthetic with possible cardiac and respiratory complication  Risk of infection and bleeding  2  Risk of neurological injury with new pain, weakness or numbness in the legs or difficulties with bowel and bladder function  Risk of CSF leak was described  3  Possible need for additional lumbar spine surgery in the future  Expected postoperative course, including activity restrictions, expected pain and postoperative medication were reviewed  Patient provided verbal consent to surgical procedure and signed consent form: Yes  History, physical examination and diagnostic tests were reviewed and questions answered  Diagnosis, care plan and treatment options were discussed  The patient understand instructions and will follow up as directed      Plan:    Follow-up:   Surgery    Problem List Items Addressed This Visit        Nervous and Auditory    Lumbar radiculopathy - Primary    Relevant Orders    Case request operating room: Left L5-S1 minimally invasive laminal foraminotomy and possible microdiskectomy with possible epidural steroid injection (Completed)    UA w Reflex to Microscopic w Reflex to Culture    Comprehensive metabolic panel    CBC and differential    APTT    Protime-INR    HEMOGLOBIN A1C W/ EAG ESTIMATION       Other    Status post lumbar discectomy    Relevant Orders    Case request operating room: Left L5-S1 minimally invasive laminal foraminotomy and possible microdiskectomy with possible epidural steroid injection (Completed)    UA w Reflex to Microscopic w Reflex to Culture    Comprehensive metabolic panel    CBC and differential    APTT    Protime-INR    HEMOGLOBIN A1C W/ EAG ESTIMATION      Other Visit Diagnoses     Pre-procedural examination        Relevant Orders    UA w Reflex to Microscopic w Reflex to Culture    Comprehensive metabolic panel    CBC and differential    APTT    Protime-INR    HEMOGLOBIN A1C W/ EAG ESTIMATION          Subjective/Objective     Chief Complaint    Left leg pain  HPI    57-year-old gentleman last seen in December of 2018  He underwent left lumbar epidural steroid injection and had improvement in his left leg pain and numbness and tingling for approximately 1-2 days though symptoms recurred  These continue impact on his quality of life in daily activities  While he can perform core strengthening exercises, he has restricted in his exercise routine and any Valsalva maneuver will result in pain shooting down his leg which is quite debilitating  He denies any significant pain, weakness or numbness in his right leg or difficulties with bowel bladder function or changing perineal sensation  He presents today to discuss the surgical options  HARSHA MCLEOD personally reviewed  Review of Systems   Constitutional: Negative for fatigue  HENT: Negative  Eyes: Negative  Respiratory: Negative  Cardiovascular: Negative  Gastrointestinal: Negative  Endocrine: Negative  Genitourinary: Negative  Musculoskeletal: Negative for back pain (pressure pain in tailbone, left leg and foot pain )  Skin: Negative  Allergic/Immunologic: Negative      Neurological: Positive for weakness (left leg/foot ) and numbness (left foot, when sneezing has tingling down left leg )  Negative for dizziness, seizures, syncope and headaches  Hematological: Negative  Psychiatric/Behavioral: Negative  Family History    Family History   Problem Relation Age of Onset    Lumbar disc disease Father        Social History    Social History     Socioeconomic History    Marital status: /Civil Union     Spouse name: Not on file    Number of children: Not on file    Years of education: Not on file    Highest education level: Not on file   Occupational History    Not on file   Social Needs    Financial resource strain: Not on file    Food insecurity:     Worry: Not on file     Inability: Not on file    Transportation needs:     Medical: Not on file     Non-medical: Not on file   Tobacco Use    Smoking status: Never Smoker    Smokeless tobacco: Never Used   Substance and Sexual Activity    Alcohol use:  Yes    Drug use: No    Sexual activity: Yes   Lifestyle    Physical activity:     Days per week: Not on file     Minutes per session: Not on file    Stress: Not on file   Relationships    Social connections:     Talks on phone: Not on file     Gets together: Not on file     Attends Ten[employer] service: Not on file     Active member of club or organization: Not on file     Attends meetings of clubs or organizations: Not on file     Relationship status: Not on file    Intimate partner violence:     Fear of current or ex partner: Not on file     Emotionally abused: Not on file     Physically abused: Not on file     Forced sexual activity: Not on file   Other Topics Concern    Not on file   Social History Narrative    Not on file       Past Medical History    Past Medical History:   Diagnosis Date    Low back pain     Lumbosacral disc disease        Surgical History    Past Surgical History:   Procedure Laterality Date    LUMBAR SPINE SURGERY  10/07/2015    Dr [facility]       Medications      Current Outpatient Medications:     ibuprofen (MOTRIN) 200 mg tablet, Take 400 mg by mouth daily as needed  , Disp: , Rfl:     Allergies    No Known Allergies    The following portions of the patient's history were reviewed and updated as appropriate: allergies, current medications, past family history, past medical history, past social history, past surgical history and problem list     Investigations    I personally reviewed the MRI results with the patient:    MRI of the lumbar spine with and without contrast dated November 26th, 2018  Previous right-sided laminotomy with some epidural scarring and recurrent disc herniation on the right  Left lateral recess stenosis secondary to ligamentous and facet hypertrophy and disc bulge  No other areas of significant neural compression  Physical Exam    Vitals:  Blood pressure 141/88, pulse 74, temperature 98 1 °F (36 7 °C), resp  rate 18, height 5' 10 5" (1 791 m), weight 88 4 kg (194 lb 12 8 oz)  ,Body mass index is 27 56 kg/m²  Physical Exam   Constitutional: He is oriented to person, place, and time  He appears well-developed and well-nourished  No distress  Eyes: EOM are normal    Cardiovascular: Normal rate, regular rhythm and normal heart sounds  Pulmonary/Chest: Effort normal and breath sounds normal  No respiratory distress  Musculoskeletal:   Midline lumbar incision well healed  Neurological: He is alert and oriented to person, place, and time  5/5 power in lower extremities  Reports normal light touch and pinprick sensation lower extremities  Walks with a steady gait  Skin: Skin is warm and dry  Psychiatric: He has a normal mood and affect  His behavior is normal    Vitals reviewed  Neurologic Exam     Mental Status   Oriented to person, place, and time       Cranial Nerves     CN III, IV, VI   Extraocular motions are normal

## 2019-02-15 NOTE — PROGRESS NOTES
Office Note - Neurosurgery   Crescencio Styles 45 y o  male MRN: [id number]      Assessment:    Patient is stable  43-year-old gentleman with persistent left lumbar radiculopathy secondary to lateral recess stenosis at L5-S1  Epidural steroid injection provided minimal improvement in his symptoms  These continue impact on his quality of life in daily activities  He is a candidate for left L5-S1 minimally invasive laminal foraminotomy and possible microdiskectomy with possible epidural steroid injection  The goal of surgery is to relieve pressure neural structures and hopefully improve radicular pain and symptoms of neurogenic claudication  Weakness, numbness and back pain are less likely to improve  The risks of surgery were described in detail  1  Risk of general anesthetic with possible cardiac and respiratory complication  Risk of infection and bleeding  2  Risk of neurological injury with new pain, weakness or numbness in the legs or difficulties with bowel and bladder function  Risk of CSF leak was described  3  Possible need for additional lumbar spine surgery in the future  Expected postoperative course, including activity restrictions, expected pain and postoperative medication were reviewed  Patient provided verbal consent to surgical procedure and signed consent form: Yes  History, physical examination and diagnostic tests were reviewed and questions answered  Diagnosis, care plan and treatment options were discussed  The patient understand instructions and will follow up as directed      Plan:    Follow-up:   Surgery    Problem List Items Addressed This Visit        Nervous and Auditory    Lumbar radiculopathy - Primary    Relevant Orders    Case request operating room: Left L5-S1 minimally invasive laminal foraminotomy and possible microdiskectomy with possible epidural steroid injection (Completed)    UA w Reflex to Microscopic w Reflex to Culture    Comprehensive metabolic panel    CBC and differential    APTT    Protime-INR    HEMOGLOBIN A1C W/ EAG ESTIMATION       Other    Status post lumbar discectomy    Relevant Orders    Case request operating room: Left L5-S1 minimally invasive laminal foraminotomy and possible microdiskectomy with possible epidural steroid injection (Completed)    UA w Reflex to Microscopic w Reflex to Culture    Comprehensive metabolic panel    CBC and differential    APTT    Protime-INR    HEMOGLOBIN A1C W/ EAG ESTIMATION      Other Visit Diagnoses     Pre-procedural examination        Relevant Orders    UA w Reflex to Microscopic w Reflex to Culture    Comprehensive metabolic panel    CBC and differential    APTT    Protime-INR    HEMOGLOBIN A1C W/ EAG ESTIMATION          Subjective/Objective     Chief Complaint    Left leg pain  HPI    80-year-old gentleman last seen in December of 2018  He underwent left lumbar epidural steroid injection and had improvement in his left leg pain and numbness and tingling for approximately 1-2 days though symptoms recurred  These continue impact on his quality of life in daily activities  While he can perform core strengthening exercises, he has restricted in his exercise routine and any Valsalva maneuver will result in pain shooting down his leg which is quite debilitating  He denies any significant pain, weakness or numbness in his right leg or difficulties with bowel bladder function or changing perineal sensation  He presents today to discuss the surgical options  HARSHA MCLEOD personally reviewed  Review of Systems   Constitutional: Negative for fatigue  HENT: Negative  Eyes: Negative  Respiratory: Negative  Cardiovascular: Negative  Gastrointestinal: Negative  Endocrine: Negative  Genitourinary: Negative  Musculoskeletal: Negative for back pain (pressure pain in tailbone, left leg and foot pain )  Skin: Negative  Allergic/Immunologic: Negative      Neurological: Positive for weakness (left leg/foot ) and numbness (left foot, when sneezing has tingling down left leg )  Negative for dizziness, seizures, syncope and headaches  Hematological: Negative  Psychiatric/Behavioral: Negative  Family History    Family History   Problem Relation Age of Onset    Lumbar disc disease Father        Social History    Social History     Socioeconomic History    Marital status: /Civil Union     Spouse name: Not on file    Number of children: Not on file    Years of education: Not on file    Highest education level: Not on file   Occupational History    Not on file   Social Needs    Financial resource strain: Not on file    Food insecurity:     Worry: Not on file     Inability: Not on file    Transportation needs:     Medical: Not on file     Non-medical: Not on file   Tobacco Use    Smoking status: Never Smoker    Smokeless tobacco: Never Used   Substance and Sexual Activity    Alcohol use:  Yes    Drug use: No    Sexual activity: Yes   Lifestyle    Physical activity:     Days per week: Not on file     Minutes per session: Not on file    Stress: Not on file   Relationships    Social connections:     Talks on phone: Not on file     Gets together: Not on file     Attends Shinto service: Not on file     Active member of club or organization: Not on file     Attends meetings of clubs or organizations: Not on file     Relationship status: Not on file    Intimate partner violence:     Fear of current or ex partner: Not on file     Emotionally abused: Not on file     Physically abused: Not on file     Forced sexual activity: Not on file   Other Topics Concern    Not on file   Social History Narrative    Not on file       Past Medical History    Past Medical History:   Diagnosis Date    Low back pain     Lumbosacral disc disease        Surgical History    Past Surgical History:   Procedure Laterality Date    LUMBAR SPINE SURGERY  10/07/2015    [facility]       Medications      Current Outpatient Medications:     ibuprofen (MOTRIN) 200 mg tablet, Take 400 mg by mouth daily as needed  , Disp: , Rfl:     Allergies    No Known Allergies    The following portions of the patient's history were reviewed and updated as appropriate: allergies, current medications, past family history, past medical history, past social history, past surgical history and problem list     Investigations    I personally reviewed the MRI results with the patient:    MRI of the lumbar spine with and without contrast dated November 26th, 2018  Previous right-sided laminotomy with some epidural scarring and recurrent disc herniation on the right  Left lateral recess stenosis secondary to ligamentous and facet hypertrophy and disc bulge  No other areas of significant neural compression  Physical Exam    Vitals:  Blood pressure 141/88, pulse 74, temperature 98 1 °F (36 7 °C), resp  rate 18, height 5' 10 5" (1 791 m), weight 88 4 kg (194 lb 12 8 oz)  ,Body mass index is 27 56 kg/m²  Physical Exam   Constitutional: He is oriented to person, place, and time  He appears well-developed and well-nourished  No distress  Eyes: EOM are normal    Cardiovascular: Normal rate, regular rhythm and normal heart sounds  Pulmonary/Chest: Effort normal and breath sounds normal  No respiratory distress  Musculoskeletal:   Midline lumbar incision well healed  Neurological: He is alert and oriented to person, place, and time  5/5 power in lower extremities  Reports normal light touch and pinprick sensation lower extremities  Walks with a steady gait  Skin: Skin is warm and dry  Psychiatric: He has a normal mood and affect  His behavior is normal    Vitals reviewed  Neurologic Exam     Mental Status   Oriented to person, place, and time       Cranial Nerves     CN III, IV, VI   Extraocular motions are normal

## 2019-02-22 ENCOUNTER — TELEPHONE (OUTPATIENT)
Dept: PAIN MEDICINE | Facility: CLINIC | Age: 39
End: 2019-02-22

## 2019-02-22 ENCOUNTER — OFFICE VISIT (OUTPATIENT)
Dept: PAIN MEDICINE | Facility: CLINIC | Age: 39
End: 2019-02-22
Payer: COMMERCIAL

## 2019-02-22 VITALS
WEIGHT: 194 LBS | HEART RATE: 76 BPM | RESPIRATION RATE: 16 BRPM | DIASTOLIC BLOOD PRESSURE: 72 MMHG | BODY MASS INDEX: 27.16 KG/M2 | SYSTOLIC BLOOD PRESSURE: 118 MMHG | HEIGHT: 71 IN

## 2019-02-22 DIAGNOSIS — M54.16 LUMBAR RADICULOPATHY: Primary | ICD-10-CM

## 2019-02-22 PROCEDURE — 99213 OFFICE O/P EST LOW 20 MIN: CPT | Performed by: ANESTHESIOLOGY

## 2019-02-22 NOTE — PROGRESS NOTES
Assessment:  1  Lumbar radiculopathy         Plan: This is a 45 y o male who presents today with low back pain and left leg pain which is neuropathic in nature s/p VICKEY w/o significant relief  He is scheduled for surgery 3/12/19  He is advised continue with HER  He will follow up post-surgery prn  My impressions and treatment recommendations were discussed in detail with the patient who verbalized understanding and had no further questions  Discharge instructions were provided  I personally saw and examined the patient and I agree with the above discussed plan of care  History of Present Illness:  Bella [place] is a 45 y o  male who presents for a follow up office visit in regards to Leg Pain and Hip Pain  The patients current symptoms includes low back pain and left leg pain which is neuropathic in nature  He states that he had only 2 days relief following the injection  He states that he is scheduled for surgery on 3/12/19  He takes motrin as needed for the time being  I have personally reviewed and/or updated the patient's past medical history, past surgical history, family history, social history, current medications, allergies, and vital signs today  Review of Systems   Respiratory: Negative for shortness of breath  Cardiovascular: Negative for chest pain  Gastrointestinal: Negative for constipation, diarrhea, nausea and vomiting  Musculoskeletal: Negative for arthralgias, gait problem, joint swelling and myalgias  Decreased ROM   Skin: Negative for rash  Neurological: Negative for dizziness, seizures and weakness  All other systems reviewed and are negative        Patient Active Problem List   Diagnosis    Status post lumbar discectomy    Lumbar radiculopathy    Lumbar spondylosis       Past Medical History:   Diagnosis Date    Low back pain     Lumbosacral disc disease        Past Surgical History:   Procedure Laterality Date    LUMBAR SPINE SURGERY 10/07/2015    Dr Constance Pa History   Problem Relation Age of Onset    Lumbar disc disease Father        Social History     Occupational History    Not on file   Tobacco Use    Smoking status: Never Smoker    Smokeless tobacco: Never Used   Substance and Sexual Activity    Alcohol use: Yes    Drug use: No    Sexual activity: Yes       Current Outpatient Medications on File Prior to Visit   Medication Sig    ibuprofen (MOTRIN) 200 mg tablet Take 400 mg by mouth daily as needed       No current facility-administered medications on file prior to visit  No Known Allergies    Physical Exam:    /72   Pulse 76   Resp 16   Ht 5' 10 5" (1 791 m)   Wt 88 kg (194 lb)   BMI 27 44 kg/m²     Constitutional:normal, well developed, well nourished, alert, in no distress and non-toxic and no overt pain behavior    Eyes:anicteric  HEENT:grossly intact  Neck:supple, symmetric, trachea midline and no masses   Pulmonary:even and unlabored  Cardiovascular:No edema or pitting edema present  Skin:Normal without rashes or lesions and well hydrated  Psychiatric:Mood and affect appropriate  Neurologic:Cranial Nerves II-XII grossly intact  Musculoskeletal:normal    Imaging

## 2019-02-28 NOTE — PRE-PROCEDURE INSTRUCTIONS
Pre-Surgery Instructions:   Medication Instructions    ibuprofen (MOTRIN) 200 mg tablet Instructed patient per Anesthesia Guidelines      Pre op and showering instructions using an antibacterial soap reviewed

## 2019-03-11 ENCOUNTER — DOCUMENTATION (OUTPATIENT)
Dept: NEUROSURGERY | Facility: CLINIC | Age: 39
End: 2019-03-11

## 2019-03-12 ENCOUNTER — HOSPITAL ENCOUNTER (OUTPATIENT)
Facility: HOSPITAL | Age: 39
Setting detail: OUTPATIENT SURGERY
Discharge: HOME | End: 2019-03-12
Attending: NEUROLOGICAL SURGERY | Admitting: NEUROLOGICAL SURGERY
Payer: COMMERCIAL

## 2019-03-12 ENCOUNTER — ANESTHESIA EVENT (OUTPATIENT)
Dept: PERIOP | Facility: HOSPITAL | Age: 39
End: 2019-03-12
Payer: COMMERCIAL

## 2019-03-12 ENCOUNTER — ANESTHESIA (OUTPATIENT)
Dept: PERIOP | Facility: HOSPITAL | Age: 39
End: 2019-03-12
Payer: COMMERCIAL

## 2019-03-12 ENCOUNTER — APPOINTMENT (OUTPATIENT)
Dept: RADIOLOGY | Facility: HOSPITAL | Age: 39
End: 2019-03-12
Payer: COMMERCIAL

## 2019-03-12 VITALS
HEIGHT: 70 IN | SYSTOLIC BLOOD PRESSURE: 126 MMHG | OXYGEN SATURATION: 95 % | WEIGHT: 194 LBS | BODY MASS INDEX: 27.77 KG/M2 | TEMPERATURE: 97 F | DIASTOLIC BLOOD PRESSURE: 75 MMHG | RESPIRATION RATE: 18 BRPM | HEART RATE: 77 BPM

## 2019-03-12 DIAGNOSIS — M47.816 LUMBAR SPONDYLOSIS: ICD-10-CM

## 2019-03-12 DIAGNOSIS — M54.16 LUMBAR RADICULOPATHY: Primary | ICD-10-CM

## 2019-03-12 LAB
GLUCOSE SERPL-MCNC: 100 MG/DL (ref 65–140)
GLUCOSE SERPL-MCNC: 134 MG/DL (ref 65–140)

## 2019-03-12 PROCEDURE — 72100 X-RAY EXAM L-S SPINE 2/3 VWS: CPT

## 2019-03-12 PROCEDURE — 82948 REAGENT STRIP/BLOOD GLUCOSE: CPT

## 2019-03-12 PROCEDURE — 63005 REMOVE SPINE LAMINA 1/2 LMBR: CPT | Performed by: NEUROLOGICAL SURGERY

## 2019-03-12 RX ORDER — CEFAZOLIN SODIUM 2 G/50ML
2000 SOLUTION INTRAVENOUS ONCE
Status: COMPLETED | OUTPATIENT
Start: 2019-03-12 | End: 2019-03-12

## 2019-03-12 RX ORDER — METHYLPREDNISOLONE ACETATE 80 MG/ML
INJECTION, SUSPENSION INTRA-ARTICULAR; INTRALESIONAL; INTRAMUSCULAR; SOFT TISSUE AS NEEDED
Status: DISCONTINUED | OUTPATIENT
Start: 2019-03-12 | End: 2019-03-12 | Stop reason: HOSPADM

## 2019-03-12 RX ORDER — ONDANSETRON 2 MG/ML
INJECTION INTRAMUSCULAR; INTRAVENOUS AS NEEDED
Status: DISCONTINUED | OUTPATIENT
Start: 2019-03-12 | End: 2019-03-12 | Stop reason: SURG

## 2019-03-12 RX ORDER — SODIUM CHLORIDE 9 MG/ML
INJECTION, SOLUTION INTRAVENOUS CONTINUOUS PRN
Status: DISCONTINUED | OUTPATIENT
Start: 2019-03-12 | End: 2019-03-12 | Stop reason: SURG

## 2019-03-12 RX ORDER — ONDANSETRON 2 MG/ML
4 INJECTION INTRAMUSCULAR; INTRAVENOUS EVERY 6 HOURS PRN
Status: DISCONTINUED | OUTPATIENT
Start: 2019-03-12 | End: 2019-03-12 | Stop reason: HOSPADM

## 2019-03-12 RX ORDER — FENTANYL CITRATE/PF 50 MCG/ML
25 SYRINGE (ML) INJECTION
Status: DISCONTINUED | OUTPATIENT
Start: 2019-03-12 | End: 2019-03-12 | Stop reason: HOSPADM

## 2019-03-12 RX ORDER — KETOROLAC TROMETHAMINE 30 MG/ML
INJECTION, SOLUTION INTRAMUSCULAR; INTRAVENOUS AS NEEDED
Status: DISCONTINUED | OUTPATIENT
Start: 2019-03-12 | End: 2019-03-12 | Stop reason: SURG

## 2019-03-12 RX ORDER — ONDANSETRON 2 MG/ML
4 INJECTION INTRAMUSCULAR; INTRAVENOUS EVERY 4 HOURS PRN
Status: DISCONTINUED | OUTPATIENT
Start: 2019-03-12 | End: 2019-03-12 | Stop reason: HOSPADM

## 2019-03-12 RX ORDER — MIDAZOLAM HYDROCHLORIDE 1 MG/ML
INJECTION INTRAMUSCULAR; INTRAVENOUS AS NEEDED
Status: DISCONTINUED | OUTPATIENT
Start: 2019-03-12 | End: 2019-03-12 | Stop reason: SURG

## 2019-03-12 RX ORDER — CHLORHEXIDINE GLUCONATE 0.12 MG/ML
15 RINSE ORAL ONCE
Status: COMPLETED | OUTPATIENT
Start: 2019-03-12 | End: 2019-03-12

## 2019-03-12 RX ORDER — OXYCODONE HYDROCHLORIDE AND ACETAMINOPHEN 5; 325 MG/1; MG/1
1 TABLET ORAL EVERY 6 HOURS PRN
Qty: 8 TABLET | Refills: 0 | Status: SHIPPED | OUTPATIENT
Start: 2019-03-12 | End: 2019-03-14

## 2019-03-12 RX ORDER — SODIUM CHLORIDE 9 MG/ML
100 INJECTION, SOLUTION INTRAVENOUS CONTINUOUS
Status: DISCONTINUED | OUTPATIENT
Start: 2019-03-12 | End: 2019-03-12 | Stop reason: HOSPADM

## 2019-03-12 RX ORDER — OXYCODONE HYDROCHLORIDE AND ACETAMINOPHEN 5; 325 MG/1; MG/1
1 TABLET ORAL EVERY 4 HOURS PRN
Status: DISCONTINUED | OUTPATIENT
Start: 2019-03-12 | End: 2019-03-12 | Stop reason: HOSPADM

## 2019-03-12 RX ORDER — DEXAMETHASONE SODIUM PHOSPHATE 10 MG/ML
INJECTION, SOLUTION INTRAMUSCULAR; INTRAVENOUS AS NEEDED
Status: DISCONTINUED | OUTPATIENT
Start: 2019-03-12 | End: 2019-03-12 | Stop reason: SURG

## 2019-03-12 RX ORDER — LIDOCAINE HYDROCHLORIDE AND EPINEPHRINE 10; 10 MG/ML; UG/ML
INJECTION, SOLUTION INFILTRATION; PERINEURAL AS NEEDED
Status: DISCONTINUED | OUTPATIENT
Start: 2019-03-12 | End: 2019-03-12 | Stop reason: HOSPADM

## 2019-03-12 RX ORDER — PROPOFOL 10 MG/ML
INJECTION, EMULSION INTRAVENOUS AS NEEDED
Status: DISCONTINUED | OUTPATIENT
Start: 2019-03-12 | End: 2019-03-12 | Stop reason: SURG

## 2019-03-12 RX ORDER — MORPHINE SULFATE 10 MG/ML
1 INJECTION, SOLUTION INTRAMUSCULAR; INTRAVENOUS
Status: DISCONTINUED | OUTPATIENT
Start: 2019-03-12 | End: 2019-03-12 | Stop reason: HOSPADM

## 2019-03-12 RX ORDER — HYDROMORPHONE HCL/PF 1 MG/ML
SYRINGE (ML) INJECTION AS NEEDED
Status: DISCONTINUED | OUTPATIENT
Start: 2019-03-12 | End: 2019-03-12 | Stop reason: SURG

## 2019-03-12 RX ORDER — BUPIVACAINE HYDROCHLORIDE AND EPINEPHRINE 5; 5 MG/ML; UG/ML
INJECTION, SOLUTION EPIDURAL; INTRACAUDAL; PERINEURAL AS NEEDED
Status: DISCONTINUED | OUTPATIENT
Start: 2019-03-12 | End: 2019-03-12 | Stop reason: HOSPADM

## 2019-03-12 RX ORDER — IBUPROFEN 400 MG/1
400 TABLET ORAL EVERY 6 HOURS PRN
Status: DISCONTINUED | OUTPATIENT
Start: 2019-03-12 | End: 2019-03-12 | Stop reason: HOSPADM

## 2019-03-12 RX ORDER — ROCURONIUM BROMIDE 10 MG/ML
INJECTION, SOLUTION INTRAVENOUS AS NEEDED
Status: DISCONTINUED | OUTPATIENT
Start: 2019-03-12 | End: 2019-03-12 | Stop reason: SURG

## 2019-03-12 RX ADMIN — LIDOCAINE HYDROCHLORIDE 100 MG: 20 INJECTION, SOLUTION INTRAVENOUS at 08:34

## 2019-03-12 RX ADMIN — SODIUM CHLORIDE: 0.9 INJECTION, SOLUTION INTRAVENOUS at 08:32

## 2019-03-12 RX ADMIN — KETOROLAC TROMETHAMINE 30 MG: 30 INJECTION, SOLUTION INTRAMUSCULAR at 09:14

## 2019-03-12 RX ADMIN — CEFAZOLIN SODIUM 2000 MG: 2 SOLUTION INTRAVENOUS at 08:10

## 2019-03-12 RX ADMIN — HYDROMORPHONE HYDROCHLORIDE 1 MG: 1 INJECTION, SOLUTION INTRAMUSCULAR; INTRAVENOUS; SUBCUTANEOUS at 09:06

## 2019-03-12 RX ADMIN — DEXAMETHASONE SODIUM PHOSPHATE 4 MG: 10 INJECTION, SOLUTION INTRAMUSCULAR; INTRAVENOUS at 08:34

## 2019-03-12 RX ADMIN — OXYCODONE HYDROCHLORIDE AND ACETAMINOPHEN 1 TABLET: 5; 325 TABLET ORAL at 11:12

## 2019-03-12 RX ADMIN — FENTANYL CITRATE 25 MCG: 50 INJECTION, SOLUTION INTRAMUSCULAR; INTRAVENOUS at 10:24

## 2019-03-12 RX ADMIN — FENTANYL CITRATE 25 MCG: 50 INJECTION, SOLUTION INTRAMUSCULAR; INTRAVENOUS at 10:33

## 2019-03-12 RX ADMIN — FENTANYL CITRATE 25 MCG: 50 INJECTION, SOLUTION INTRAMUSCULAR; INTRAVENOUS at 10:27

## 2019-03-12 RX ADMIN — FENTANYL CITRATE 25 MCG: 50 INJECTION, SOLUTION INTRAMUSCULAR; INTRAVENOUS at 10:30

## 2019-03-12 RX ADMIN — HYDROMORPHONE HYDROCHLORIDE 1 MG: 1 INJECTION, SOLUTION INTRAMUSCULAR; INTRAVENOUS; SUBCUTANEOUS at 08:34

## 2019-03-12 RX ADMIN — CHLORHEXIDINE GLUCONATE 15 ML: 1.2 RINSE ORAL at 07:58

## 2019-03-12 RX ADMIN — ROCURONIUM BROMIDE 40 MG: 10 INJECTION, SOLUTION INTRAVENOUS at 08:34

## 2019-03-12 RX ADMIN — PROPOFOL 200 MG: 10 INJECTION, EMULSION INTRAVENOUS at 08:34

## 2019-03-12 RX ADMIN — ONDANSETRON 4 MG: 2 INJECTION INTRAMUSCULAR; INTRAVENOUS at 09:14

## 2019-03-12 RX ADMIN — MIDAZOLAM HYDROCHLORIDE 2 MG: 1 INJECTION, SOLUTION INTRAMUSCULAR; INTRAVENOUS at 08:28

## 2019-03-12 RX ADMIN — ONDANSETRON 4 MG: 2 INJECTION INTRAMUSCULAR; INTRAVENOUS at 11:16

## 2019-03-12 NOTE — ANESTHESIA PREPROCEDURE EVALUATION
Review of Systems/Medical History  Patient summary reviewed  Chart reviewed  No history of anesthetic complications     Cardiovascular  Negative cardio ROS Exercise tolerance (METS): >4,     Pulmonary  Negative pulmonary ROS        GI/Hepatic  Negative GI/hepatic ROS          Negative  ROS        Endo/Other  Negative endo/other ROS      GYN       Hematology  Negative hematology ROS      Musculoskeletal  Negative musculoskeletal ROS Back pain (left leg radiculopathy) , lumbar pain,   Arthritis     Neurology  Negative neurology ROS      Psychology   Negative psychology ROS              Physical Exam    Airway    Mallampati score: I  TM Distance: >3 FB  Neck ROM: full     Dental   No notable dental hx     Cardiovascular  Comment: Negative ROS, Cardiovascular exam normal    Pulmonary  Pulmonary exam normal     Other Findings      Lab Results   Component Value Date    WBC 5 39 02/15/2019    HGB 15 7 02/15/2019    HCT 46 2 02/15/2019    MCV 93 02/15/2019     02/15/2019     Lab Results   Component Value Date    SODIUM 140 02/15/2019    K 4 0 02/15/2019    CO2 27 02/15/2019     02/15/2019    BUN 14 02/15/2019    CREATININE 0 88 02/15/2019     Lab Results   Component Value Date    INR 1 00 02/15/2019    PROTIME 12 9 02/15/2019     Lab Results   Component Value Date    PTT 33 02/15/2019     Lab Results   Component Value Date    HGBA1C 5 6 02/15/2019     Anesthesia Plan  ASA Score- 1     Anesthesia Type- general with ASA Monitors  Additional Monitors:   Airway Plan: ETT  Plan Factors-    Induction- intravenous  Postoperative Plan- Plan for postoperative opioid use  Informed Consent- Anesthetic plan and risks discussed with patient  I personally reviewed this patient with the CRNA  Discussed and agreed on the Anesthesia Plan with the CRNA  Isak Ospina

## 2019-03-12 NOTE — INTERVAL H&P NOTE
H&P reviewed  After examining the patient I find no changes in the patients condition since the H&P had been written  Patient personally seen and examined  Neurological examination unchanged compared to last office/progress note, with the following exceptions:    None  Patient continues to have intermittent jolting left leg pain  Has stopped all antiplatelet/anticoagulation medication as previously instructed  Post operative instructions and medications have been reviewed with the patient  Assessment and Plan:    All questions have been answered to the patient satisfaction  Plan to proceed with left L5-S1 minimally invasive laminal foraminotomy and possible microdiskectomy with possible epidural steroid injection  They are in agreement with proceeding

## 2019-03-12 NOTE — DISCHARGE INSTRUCTIONS
Spine Day Surgery Discharge Instructions    Activity:    1  Do not lift more than 20 pounds for 2 weeks  Surgical incision care:    1  Keep dressing in place for 3 days  2  Keep incision dry for 3 days  3  May allow clean water to flow over incision after 3 days  4  Do not immerse the incision in water for 4 weeks  5  After 3 days, incision may be left open to air, but should remain clean  6  Do not apply any creams or ointments to the incision, unless otherwise instructed by [facility]  7  Contact office if increasing redness, drainage, pain or swelling around the incision  Postoperative medication:    1  [facility] will provide pain medication for the first 2 weeks after surgery as coordinated with your pain specialist    2  If [facility] is providing pain medication, please contact office for questions regarding dosage and modifications  3  If [facility] is not providing pain medication postoperatively, then contact pain specialist for additional instructions and prescriptions  4  Resume antiplatelet/anticoagulation medication 2 days after surgery, unless you notice new neurological symptoms or bleeding from incision  5  Do not operate heavy machinery or vehicles while taking sedating medications

## 2019-03-12 NOTE — OP NOTE
OPERATIVE REPORT  PATIENT NAME: Jana Montejo    :  1980  MRN: [id number]  Pt Location: AN OR ROOM 04    SURGERY DATE: 3/12/2019    Surgeon(s) and Role:     * Neida Rivera MD - Primary  No assistance  No qualified residents available  Preop Diagnosis:  Lumbar radiculopathy [M54 16]  Status post lumbar discectomy [Z98 890]  Lumbar stenosis  Post-Op Diagnosis Codes:     * Lumbar radiculopathy [M54 16]     * Status post lumbar discectomy [Z98 890]     * Lumbar stenosis    Procedure:  1  Left L5-S1 minimally invasive laminal foraminotomy and epidural steroid injection  2  Operating microscope for microdissection  Specimen(s):  * No specimens in log *    Estimated Blood Loss:   Minimal    Drains:  * No LDAs found *    Anesthesia Type:   General    Operative Indications:  Lumbar radiculopathy [M54 16]  Status post lumbar discectomy [Z98 890]    Operative Findings:  Lateral recess stenosis secondary to left sided ligamentous hypertrophy  Complications:   None    Procedure and Technique:  Clinical Note:    The goals and alternatives to the procedure described above were discussed with patient  Surgery is intended to decompress neural structures and hopefully improve radicular pain  Weakness, numbness and back pain are less likely to improve  The risks of surgery were described in detail  1  Risk of general anesthetic, with possible cardiac and respiratory complication  There is risk of infection and bleeding  2  Risk of neurological injury with new pain, weakness or numbness or difficulties with bowel and bladder function  The risk of CSF leak was described  3  Possible need for revision surgery in the future  Once all questions were answered to their satisfaction, they asked to proceed with surgery  Operating Room Note    The patient was brought to the operating room and placed under general anesthetic   Once all appropriate lines were in position, the patient was carefully turned in the prone position onto a Ross frame  Care was taken to ensure that all pressure points were padded and the neck was in a neutral position  AP and lateral fluoroscopy were used to identify midline of the lumbar spine  A mini surgical timeout was undertaken identifying site side and level of surgery  The left the midline was prepped with alcohol swab  A spinal needle was placed under lateral fluoroscopic guidance demonstrated appropriate trajectory to the appropriate lumbar interspace  One percent lidocaine with 100,000 of epinephrine was used to infiltrate the soft tissue as the spinal needle was removed  A 2 cm incision was planned based on the entry point of the needle  The skin was then prepped and draped in usual sterile fashion  A full surgical timeout was undertaken identifying the site, side and level of surgery  The patient received preoperative antibiotics  10 blade was used to incise the skin  Monopolar cautery was used to dissect down and through the muscle fascia  A series of Metrx dilators were placed and a final Metrix retractor was placed with lateral fluoroscopy confirming the L5/S1 level  This was also confirmed radiology  The operating microscope was then brought in for microdissection  Under microscopic magnification, monopolar cautery was used to remove the soft tissue off the lamina  A ball bur was used to drill the lamina and expose the underlying ligamentum flavum which was quite thickened  The ligamentum flavum was undermined with a curved curette and removed Kerrison punch  I was able to identify the thecal sac and shoulder of the nerve root  Bipolar cautery and FloSeal were used for hemostasis  The lamina were further undermined and additional ligamentum flavum was removed  There was no further compression on the a nerve root  I could pass a ball hook along the ventral surface of the thecal sac and lateral aspect of the nerve root and there was no further compression    There was a small disc bulge there was no obvious disc herniation  Ball hook could be easily passed into the left L5-S1 foramen  Penfield 4 and ball hook were then used to demonstrate the rostral caudal extent of decompression on lateral fluoroscopy  These instruments were then removed  Surgical site was irrigated with antibiotic irrigation and FloSeal were used for hemostasis  40 mg of Depo-Medrol then placed in the epidural space    The Metrx retractor was removed any bleeding from soft tissue was cauterized with monopolar and bipolar cautery  The surgical site was irrigated copiously with antibiotic irrigation  Vicryl suture was used to approximate the fascia and subcutaneous tissue  Monocryl was used to approximate the skin edges  0 5% Marcaine with 1/100,000 of epinephrine was used to infiltrate the soft tissue  A Miplex was applied  The count was correct at the end of the case and there were no complications  The patient was carefully transferred onto the operating gurney and extubated  The patient was transferred to the PACU where they were noted to be hemodynamically stable and neurologically unchanged  The results of surgery were discussed with patient and family      I was present for the entire procedure    Patient Disposition:  PACU     SIGNATURE: Neida Rivera MD  DATE: March 12, 2019  TIME: 10:03 AM

## 2019-03-13 ENCOUNTER — TELEPHONE (OUTPATIENT)
Dept: NEUROSURGERY | Facility: CLINIC | Age: 39
End: 2019-03-13

## 2019-03-13 NOTE — TELEPHONE ENCOUNTER
Called Yohannes Duran to follow up on patient POD 1  Patient reports that he is doing very well overall and denies any incisional issues or fevers  Patient denies any bleeding, dizziness, fever, redness, significant pain, swelling and states that his bandage is C/D/I  Verified date/time/location of his upcoming POV (patient needed to reschedule until the following day) and advised him to call the office with any further questions or concerns, or if any incisional issues or fevers would arise  Pain medication is currently controlling pain and he hasn't taken anything for pain since yesterday evening  Patient received and does understand the discharge instructions  Reviewed incision care with Patient and he has no further questions at this time  Patient was appreciative for the call

## 2019-03-24 ENCOUNTER — TELEPHONE (OUTPATIENT)
Dept: OTHER | Facility: HOSPITAL | Age: 39
End: 2019-03-24

## 2019-03-24 DIAGNOSIS — Z98.890 STATUS POST LUMBAR SURGERY: Primary | ICD-10-CM

## 2019-03-24 RX ORDER — CEPHALEXIN 500 MG/1
500 CAPSULE ORAL EVERY 6 HOURS SCHEDULED
Qty: 28 CAPSULE | Refills: 0 | Status: SHIPPED | OUTPATIENT
Start: 2019-03-24 | End: 2019-03-31

## 2019-03-24 NOTE — TELEPHONE ENCOUNTER
Patient is status post minimally invasive lumbar surgery with epidural steroid injection  He has a follow-up appointment in 2 days  He called because his incision is draining clear yellow fluid  It is slightly tender although is not particularly read  He has no fever chills  He has no headache  This sutures are intact  I sent a prescription for Keflex 500 mg  He should call with any questions or concerns and otherwise follow up on Tuesday for his appointment as scheduled  He will cover the area with a dry sterile gauze  Change as needed

## 2019-03-25 NOTE — PROGRESS NOTES
Post-Op Visit-Neurosurgery    Jon Parr 44 y o  male MRN: [id number]    Vitals:    03/26/19 1431   BP: 128/86   Resp: 16   Temp: 98 3 °F (36 8 °C)   Weight: 86 2 kg (190 lb)   Height: 5' 10" (1 778 m)     Chief Complaint  Patient presents post:  Left L5-S1 minimally invasive laminal foraminotomy and epidural steroid injection  History of Present Illness  Patient presents for 2 week POV for incision check unaccompanied and without an assistive device  He reports that he is doing very well overall and denies any fevers/chills  States that over the weekend, his incision started to swell up a bit and became reddened around the edges (see image below 3/24/19)  He called the on-call service and Deon Monday JOSHUA, prescribed him a 7 day course of keflex  Patient reports that the incision has been looking better since  He is happy to report that his pain has decreased significantly since the surgery  He denies any new weakness, numbness or tingling of his extremities  Assessment  Wound Exam: Incision well approximated  Mild erythema surrounding sutures  Yellow granulation tissue in the wound bed with serous drainage present  (see image below 3/26/19)  location: Midline low back  Complications: None  Incision MARIO  Image above from 3/24/19        Image above from 3/26/19       Discussion/Summary  Reviewed incision care with patient including daily observation for s/s infection including: increased erythema, edema, drainage, dehiscence of incision or fever >101  Should these be observed, he understands that he is to call and/or return immediately for reassessment  Advised patient to continue cleansing area with mild soap and water and pat dry  Not to apply any lotions, creams, or ointments, & not to submerge in any water for two more weeks  He is to maintain activity restrictions until cleared by the surgeon   Activity levels were also reviewed with the patient in detail, he is to slowly increase his level of activity and ambulation is encouraged as tolerated  Verified date/time/location of upcoming POV and reminded him to call the office with any further questions or concerns, or if any incisional issues or fevers would arise  Patient is aware that he should finish out his course of antibiotics and call the office immediately with any worsening symptoms  He will contact me in 1 week to send me the progress of his incision  Patient was appreciative

## 2019-03-26 ENCOUNTER — CLINICAL SUPPORT (OUTPATIENT)
Dept: NEUROSURGERY | Facility: CLINIC | Age: 39
End: 2019-03-26

## 2019-03-26 VITALS
BODY MASS INDEX: 27.2 KG/M2 | WEIGHT: 190 LBS | TEMPERATURE: 98.3 F | HEIGHT: 70 IN | SYSTOLIC BLOOD PRESSURE: 128 MMHG | RESPIRATION RATE: 16 BRPM | DIASTOLIC BLOOD PRESSURE: 86 MMHG

## 2019-03-26 DIAGNOSIS — Z98.890 STATUS POST SURGERY: Primary | ICD-10-CM

## 2019-03-26 PROCEDURE — 99024 POSTOP FOLLOW-UP VISIT: CPT

## 2019-04-23 ENCOUNTER — OFFICE VISIT (OUTPATIENT)
Dept: NEUROSURGERY | Facility: CLINIC | Age: 39
End: 2019-04-23

## 2019-04-23 VITALS
SYSTOLIC BLOOD PRESSURE: 134 MMHG | TEMPERATURE: 97.7 F | RESPIRATION RATE: 16 BRPM | BODY MASS INDEX: 27.2 KG/M2 | DIASTOLIC BLOOD PRESSURE: 81 MMHG | WEIGHT: 190 LBS | HEIGHT: 70 IN | HEART RATE: 85 BPM

## 2019-04-23 DIAGNOSIS — Z98.890 STATUS POST LUMBAR SURGERY: Primary | ICD-10-CM

## 2019-04-23 PROCEDURE — 99024 POSTOP FOLLOW-UP VISIT: CPT | Performed by: PHYSICIAN ASSISTANT

## 2019-11-06 ENCOUNTER — TELEPHONE (OUTPATIENT)
Dept: NEUROSURGERY | Facility: CLINIC | Age: 39
End: 2019-11-06

## 2019-11-06 DIAGNOSIS — M54.16 LUMBAR RADICULOPATHY: ICD-10-CM

## 2019-11-06 DIAGNOSIS — Z98.890 STATUS POST LUMBAR DISCECTOMY: Primary | ICD-10-CM

## 2019-11-06 DIAGNOSIS — M47.816 LUMBAR SPONDYLOSIS: ICD-10-CM

## 2019-11-06 NOTE — TELEPHONE ENCOUNTER
----- Message from Elda Boyd MD sent at 11/6/2019 11:26 AM EST -----  Regarding: RE: worsening symptoms  Can obtain a flexion-extension film of the lumbar spine  Can get a repeat MRI of the lumbar spine with and without contrast as well  Would also recommend he follow up with pain management in the interim as well  I can see him or he can come in as a shared visit   ----- Message -----  From: Davi Pena RN  Sent: 11/6/2019   9:12 AM EST  To: Elda Boyd MD  Subject: worsening symptoms                               Patient is approximately 8 months s/p L5-S1 microdisc  States that his symptoms have gotten progressively worse and now feels like he is back to how he was feeling prior to surgery and would like to f/u in office  Would you like me to put in an order for an x-ray? Also, should I schedule with just you or as a SNPX or just Russell? Thanks

## 2019-11-06 NOTE — TELEPHONE ENCOUNTER
Scripts placed  Called patient back to inform him of this and to schedule appts with no answer  LMOM for call back at his convenience

## 2019-11-07 NOTE — TELEPHONE ENCOUNTER
Patient returned my call  He is aware that he needs to complete x-ray as well and scheduled him for f/u with  on 11/15/19 @ 2pm   Patient was appreciative

## 2019-11-11 ENCOUNTER — HOSPITAL ENCOUNTER (OUTPATIENT)
Dept: RADIOLOGY | Facility: AMBULARY SURGERY CENTER | Age: 39
Discharge: HOME | End: 2019-11-11

## 2019-11-11 ENCOUNTER — HOSPITAL ENCOUNTER (OUTPATIENT)
Dept: RADIOLOGY | Facility: HOSPITAL | Age: 39
Discharge: HOME | End: 2019-11-11
Payer: COMMERCIAL

## 2019-11-11 ENCOUNTER — HOSPITAL ENCOUNTER (OUTPATIENT)
Dept: MRI IMAGING | Facility: HOSPITAL | Age: 39
Discharge: HOME | End: 2019-11-11
Attending: NEUROLOGICAL SURGERY
Payer: COMMERCIAL

## 2019-11-11 DIAGNOSIS — M54.16 LUMBAR RADICULOPATHY: ICD-10-CM

## 2019-11-11 DIAGNOSIS — Z98.890 STATUS POST LUMBAR DISCECTOMY: ICD-10-CM

## 2019-11-11 DIAGNOSIS — M47.816 LUMBAR SPONDYLOSIS: ICD-10-CM

## 2019-11-11 PROCEDURE — 72120 X-RAY BEND ONLY L-S SPINE: CPT

## 2019-11-11 PROCEDURE — 72158 MRI LUMBAR SPINE W/O & W/DYE: CPT

## 2019-11-11 PROCEDURE — A9585 GADOBUTROL INJECTION: HCPCS | Performed by: NEUROLOGICAL SURGERY

## 2019-11-11 RX ADMIN — GADOBUTROL 8 ML: 604.72 INJECTION INTRAVENOUS at 21:40

## 2019-11-15 ENCOUNTER — OFFICE VISIT (OUTPATIENT)
Dept: NEUROSURGERY | Facility: CLINIC | Age: 39
End: 2019-11-15
Payer: COMMERCIAL

## 2019-11-15 VITALS
DIASTOLIC BLOOD PRESSURE: 86 MMHG | SYSTOLIC BLOOD PRESSURE: 121 MMHG | RESPIRATION RATE: 16 BRPM | HEIGHT: 70 IN | WEIGHT: 189.8 LBS | BODY MASS INDEX: 27.17 KG/M2 | HEART RATE: 76 BPM

## 2019-11-15 DIAGNOSIS — M47.816 LUMBAR SPONDYLOSIS: Primary | ICD-10-CM

## 2019-11-15 DIAGNOSIS — M51.26 RECURRENT DISPLACEMENT OF LUMBAR DISC: ICD-10-CM

## 2019-11-15 DIAGNOSIS — M54.16 LUMBAR RADICULOPATHY: ICD-10-CM

## 2019-11-15 PROCEDURE — 99215 OFFICE O/P EST HI 40 MIN: CPT | Performed by: NEUROLOGICAL SURGERY

## 2019-11-15 RX ORDER — CHLORAL HYDRATE 500 MG
1000 CAPSULE ORAL DAILY
COMMUNITY
End: 2019-12-02

## 2019-11-15 RX ORDER — DIPHENOXYLATE HYDROCHLORIDE AND ATROPINE SULFATE 2.5; .025 MG/1; MG/1
1 TABLET ORAL DAILY
COMMUNITY

## 2019-11-15 RX ORDER — IBUPROFEN 200 MG
600 TABLET ORAL AS NEEDED
COMMUNITY
End: 2020-11-19 | Stop reason: ALTCHOICE

## 2019-11-15 NOTE — H&P (VIEW-ONLY)
Office Note - Neurosurgery   Rafa Greenwood 44 y o  male MRN: [id number]      Assessment:    Patient is gradually worsening  79-year-old gentleman with recurrent left lumbar radiculopathy, likely secondary to enlarging L5-S1 disc herniation  He will contact his pain specialist to discuss a course of membrane stabilizing agents  At present he is to uncomfortable to try any physical therapy  We discussed reopening of lumbar incision for minimally invasive left L5-S1 microdiskectomy and possible epidural steroid injection versus reopening of lumbar incision for L5-S1 posterior decompression with instrumented fixation fusion and possible transverse lumbar interbody fusion with possible extension to additional levels  The relative risks and benefits and alternatives to each procedure were reviewed in detail  The goal of surgery is to relieve pressure neural structures and hopefully improve radicular pain and symptoms of neurogenic claudication  Weakness, numbness and back pain are less likely to improve  The risks of surgery were described in detail  1  Risk of general anesthetic with possible cardiac and respiratory complication  Risk of infection and bleeding  2  Risk of neurological injury with new pain, weakness or numbness in the legs or difficulties with bowel and bladder function  Risk of CSF leak was described  3  Possible need for additional lumbar spine surgery in the future  Specifically, while lumbar decompression and fusion would likely present more up front risk of infection, wound healing issues and a longer recovery, it is more likely to provide a longer term solution in the setting of the L5-S1 level which is already required 2 previous surgeries for disc herniation and spondylosis  Expected postoperative course, including activity restrictions, expected pain and postoperative medication were reviewed      Patient provided verbal consent to surgical procedure and signed consent form: Yes, the patient will contact this office next week to indicate which surgery he would prefer to proceed with  I offered to arrange for 2nd surgical opinion but he declined  He will contact this office should any concerns arise in the interim  At his request, I have also referred him to a PCP at [facility] to establish care for regular health maintenance  History, physical examination and diagnostic tests were reviewed and questions answered  Diagnosis, care plan and treatment options were discussed  The patient understand instructions and will follow up as directed  Plan:    Follow-up:  Surgery    Problem List Items Addressed This Visit        Nervous and Auditory    Lumbar radiculopathy    Relevant Orders    Ambulatory referral to Family Practice       Musculoskeletal and Integument    Lumbar spondylosis - Primary    Relevant Orders    Ambulatory referral to Family Practice    Recurrent displacement of lumbar disc          Subjective/Objective     Chief Complaint    Left leg pain  HPI    Pleasant 72-year-old gentleman who previously underwent a right-sided L5-S1 microdiskectomy with another surgeon for right lumbar radiculopathy  In March of this year he underwent a left L5-S1 minimally invasive laminal foraminotomy and epidural steroid injection for left lumbar radiculopathy  Initially, his symptoms improved significantly  However the began to recur and in October of this year, after undergoing chiropractic manipulation and attempting over-the-counter NSAIDs, he had significant exacerbation of his pain  He has lower back pain which radiates down the back of his left leg and into his calf  This is uncomfortable all the time but standing and walking seem to exacerbate the pain  There is no associated numbness or weakness  He denies any pain, weakness or numbness in his right leg or difficulties with bowel bladder function or changing perineal sensation    He is uncomfortable all the time and finds it difficult to participate in family activities  Chiropractic manipulation was not helpful and NSAIDs minimally improved the pain  He has not tried any membrane stabilizing agents and is not particularly interested in additional epidural steroid injections as previous injections were not helpful  He is to uncomfortable for physical therapy  ROS  HARSHA personally reviewed and updated  Review of Systems   Constitutional: Negative  HENT: Negative  Eyes: Negative  Respiratory: Negative  Cardiovascular: Negative  Gastrointestinal: Negative  Endocrine: Negative  Genitourinary: Negative  Musculoskeletal: Positive for back pain (occassional center of lower back radiates into left side lower back, left buttock, and down left leg into left foot )  Negative for gait problem  Skin: Negative  Allergic/Immunologic: Negative  Neurological: Positive for numbness (left foot tingling)  Negative for dizziness, seizures, syncope, weakness and headaches  Hematological: Negative  Psychiatric/Behavioral: Negative  Family History    Family History   Problem Relation Age of Onset    Lumbar disc disease Father        Social History    Social History     Socioeconomic History    Marital status: /Civil Union     Spouse name: Not on file    Number of children: Not on file    Years of education: Not on file    Highest education level: Not on file   Occupational History    Not on file   Social Needs    Financial resource strain: Not on file    Food insecurity:     Worry: Not on file     Inability: Not on file    Transportation needs:     Medical: Not on file     Non-medical: Not on file   Tobacco Use    Smoking status: Never Smoker    Smokeless tobacco: Never Used   Substance and Sexual Activity    Alcohol use:  Yes     Alcohol/week: 4 0 standard drinks     Types: 4 Cans of beer per week     Frequency: 2-3 times a week     Drinks per session: 1 or 2 Binge frequency: Never    Drug use: No    Sexual activity: Yes   Lifestyle    Physical activity:     Days per week: Not on file     Minutes per session: Not on file    Stress: Not on file   Relationships    Social connections:     Talks on phone: Not on file     Gets together: Not on file     Attends Buddhism service: Not on file     Active member of club or organization: Not on file     Attends meetings of clubs or organizations: Not on file     Relationship status: Not on file    Intimate partner violence:     Fear of current or ex partner: Not on file     Emotionally abused: Not on file     Physically abused: Not on file     Forced sexual activity: Not on file   Other Topics Concern    Not on file   Social History Narrative    Not on file       Past Medical History    Past Medical History:   Diagnosis Date    Low back pain     Lumbosacral disc disease        Surgical History    Past Surgical History:   Procedure Laterality Date    LUMBAR SPINE SURGERY  10/07/2015    Dr Gwendolyn Gill LAMINEC/FACETECT/FORAMIN,LUMBAR 1 SEG Left 3/12/2019    Procedure: L5-S1 MINIMALLY INVASIVE LAMINAL FORAMINOTOMY AND POSSIBLE MICRODISCECTOMY WITH POSSIBLE EPIDURAL STEROID INJECTION;  Surgeon: Dick Gallego MD;  Location: [facility];  Service: Neurosurgery       Medications      Current Outpatient Medications:     ibuprofen (MOTRIN) 200 mg tablet, Take 600 mg by mouth as needed for mild pain, Disp: , Rfl:     multivitamin (THERAGRAN) TABS, Take 1 tablet by mouth daily, Disp: , Rfl:     Omega-3 Fatty Acids (FISH OIL) 1,000 mg, Take 1,000 mg by mouth daily, Disp: , Rfl:     TURMERIC PO, Take by mouth daily, Disp: , Rfl:     Allergies    No Known Allergies    The following portions of the patient's history were reviewed and updated as appropriate: allergies, current medications, past family history, past medical history, past social history, past surgical history and problem list     Investigations    I personally reviewed the MRI and XRAY results with the patient:    Upright flexion-extension film lumbar spine dated November 11th, 2019  Normal lumbar lordosis  Loss of disc height at L5-S1  No gross instability on flexion-extension  MRI of the lumbar spine dated November 11th, 2019 with and without contrast   Comparison to preoperative imaging from 2018  Interval left-sided laminal foraminotomy at L5-S1  Previous disc bulge on the left at L5-S1 appears to be larger and is compressing the nerve root  There is epidural scarring around the nerve root with stable epidural scarring on the right side  There is left greater than right foraminal stenosis  No other areas of significant neural compression  Physical Exam    Vitals:  Blood pressure 121/86, pulse 76, resp  rate 16, height 5' 10" (1 778 m), weight 86 1 kg (189 lb 12 8 oz)  ,Body mass index is 27 23 kg/m²  Physical Exam   Constitutional: He is oriented to person, place, and time  He appears well-developed and well-nourished  He appears distressed  HENT:   Head: Atraumatic  Eyes: EOM are normal    Cardiovascular: Normal rate, regular rhythm and normal heart sounds  Pulmonary/Chest: Effort normal and breath sounds normal  No respiratory distress  Musculoskeletal: He exhibits no deformity  Restricted range of motion on flexion-extension lumbar spine secondary to pain  Neurological: He is alert and oriented to person, place, and time  5/5 power in lower extremities  Reports normal light touch sensation lower extremities  No clonus  Straight leg raise positive on the left  Walks with an antalgic gait favoring left leg  Skin: Skin is warm and dry  Lumbar incisions well healed  Psychiatric: He has a normal mood and affect  Vitals reviewed  Neurologic Exam     Mental Status   Oriented to person, place, and time       Cranial Nerves     CN III, IV, VI   Extraocular motions are normal

## 2019-11-15 NOTE — PROGRESS NOTES
Office Note - Neurosurgery   Semaj Carrasco 44 y o  male MRN: [id number]      Assessment:    Patient is gradually worsening  79-year-old gentleman with recurrent left lumbar radiculopathy, likely secondary to enlarging L5-S1 disc herniation  He will contact his pain specialist to discuss a course of membrane stabilizing agents  At present he is to uncomfortable to try any physical therapy  We discussed reopening of lumbar incision for minimally invasive left L5-S1 microdiskectomy and possible epidural steroid injection versus reopening of lumbar incision for L5-S1 posterior decompression with instrumented fixation fusion and possible transverse lumbar interbody fusion with possible extension to additional levels  The relative risks and benefits and alternatives to each procedure were reviewed in detail  The goal of surgery is to relieve pressure neural structures and hopefully improve radicular pain and symptoms of neurogenic claudication  Weakness, numbness and back pain are less likely to improve  The risks of surgery were described in detail  1  Risk of general anesthetic with possible cardiac and respiratory complication  Risk of infection and bleeding  2  Risk of neurological injury with new pain, weakness or numbness in the legs or difficulties with bowel and bladder function  Risk of CSF leak was described  3  Possible need for additional lumbar spine surgery in the future  Specifically, while lumbar decompression and fusion would likely present more up front risk of infection, wound healing issues and a longer recovery, it is more likely to provide a longer term solution in the setting of the L5-S1 level which is already required 2 previous surgeries for disc herniation and spondylosis  Expected postoperative course, including activity restrictions, expected pain and postoperative medication were reviewed      Patient provided verbal consent to surgical procedure and signed consent form: Yes, the patient will contact this office next week to indicate which surgery he would prefer to proceed with  I offered to arrange for 2nd surgical opinion but he declined  He will contact this office should any concerns arise in the interim  At his request, I have also referred him to a PCP at [address] [place] [address] to establish care for regular health maintenance  History, physical examination and diagnostic tests were reviewed and questions answered  Diagnosis, care plan and treatment options were discussed  The patient understand instructions and will follow up as directed  Plan:    Follow-up:  Surgery    Problem List Items Addressed This Visit        Nervous and Auditory    Lumbar radiculopathy    Relevant Orders    Ambulatory referral to Family Practice       Musculoskeletal and Integument    Lumbar spondylosis - Primary    Relevant Orders    Ambulatory referral to Family Practice    Recurrent displacement of lumbar disc          Subjective/Objective     Chief Complaint    Left leg pain  HPI    Pleasant 49-year-old gentleman who previously underwent a right-sided L5-S1 microdiskectomy with another surgeon for right lumbar radiculopathy  In March of this year he underwent a left L5-S1 minimally invasive laminal foraminotomy and epidural steroid injection for left lumbar radiculopathy  Initially, his symptoms improved significantly  However the began to recur and in October of this year, after undergoing chiropractic manipulation and attempting over-the-counter NSAIDs, he had significant exacerbation of his pain  He has lower back pain which radiates down the back of his left leg and into his calf  This is uncomfortable all the time but standing and walking seem to exacerbate the pain  There is no associated numbness or weakness  He denies any pain, weakness or numbness in his right leg or difficulties with bowel bladder function or changing perineal sensation    He is uncomfortable all the time and finds it difficult to participate in family activities  Chiropractic manipulation was not helpful and NSAIDs minimally improved the pain  He has not tried any membrane stabilizing agents and is not particularly interested in additional epidural steroid injections as previous injections were not helpful  He is to uncomfortable for physical therapy  ROS  HARSHA personally reviewed and updated  Review of Systems   Constitutional: Negative  HENT: Negative  Eyes: Negative  Respiratory: Negative  Cardiovascular: Negative  Gastrointestinal: Negative  Endocrine: Negative  Genitourinary: Negative  Musculoskeletal: Positive for back pain (occassional center of lower back radiates into left side lower back, left buttock, and down left leg into left foot )  Negative for gait problem  Skin: Negative  Allergic/Immunologic: Negative  Neurological: Positive for numbness (left foot tingling)  Negative for dizziness, seizures, syncope, weakness and headaches  Hematological: Negative  Psychiatric/Behavioral: Negative  Family History    Family History   Problem Relation Age of Onset    Lumbar disc disease Father        Social History    Social History     Socioeconomic History    Marital status: /Civil Union     Spouse name: Not on file    Number of children: Not on file    Years of education: Not on file    Highest education level: Not on file   Occupational History    Not on file   Social Needs    Financial resource strain: Not on file    Food insecurity:     Worry: Not on file     Inability: Not on file    Transportation needs:     Medical: Not on file     Non-medical: Not on file   Tobacco Use    Smoking status: Never Smoker    Smokeless tobacco: Never Used   Substance and Sexual Activity    Alcohol use:  Yes     Alcohol/week: 4 0 standard drinks     Types: 4 Cans of beer per week     Frequency: 2-3 times a week     Drinks per session: 1 or 2 Binge frequency: Never    Drug use: No    Sexual activity: Yes   Lifestyle    Physical activity:     Days per week: Not on file     Minutes per session: Not on file    Stress: Not on file   Relationships    Social connections:     Talks on phone: Not on file     Gets together: Not on file     Attends Mosque service: Not on file     Active member of club or organization: Not on file     Attends meetings of clubs or organizations: Not on file     Relationship status: Not on file    Intimate partner violence:     Fear of current or ex partner: Not on file     Emotionally abused: Not on file     Physically abused: Not on file     Forced sexual activity: Not on file   Other Topics Concern    Not on file   Social History Narrative    Not on file       Past Medical History    Past Medical History:   Diagnosis Date    Low back pain     Lumbosacral disc disease        Surgical History    Past Surgical History:   Procedure Laterality Date    LUMBAR SPINE SURGERY  10/07/2015    Dr Tracy Regan LAMINEC/FACETECT/FORAMIN,LUMBAR 1 SEG Left 3/12/2019    Procedure: L5-S1 MINIMALLY INVASIVE LAMINAL FORAMINOTOMY AND POSSIBLE MICRODISCECTOMY WITH POSSIBLE EPIDURAL STEROID INJECTION;  Surgeon: Kylah Rosario MD;  Location[facility];  Service: Neurosurgery       Medications      Current Outpatient Medications:     ibuprofen (MOTRIN) 200 mg tablet, Take 600 mg by mouth as needed for mild pain, Disp: , Rfl:     multivitamin (THERAGRAN) TABS, Take 1 tablet by mouth daily, Disp: , Rfl:     Omega-3 Fatty Acids (FISH OIL) 1,000 mg, Take 1,000 mg by mouth daily, Disp: , Rfl:     TURMERIC PO, Take by mouth daily, Disp: , Rfl:     Allergies    No Known Allergies    The following portions of the patient's history were reviewed and updated as appropriate: allergies, current medications, past family history, past medical history, past social history, past surgical history and problem list     Investigations    I personally reviewed the MRI and XRAY results with the patient:    Upright flexion-extension film lumbar spine dated November 11th, 2019  Normal lumbar lordosis  Loss of disc height at L5-S1  No gross instability on flexion-extension  MRI of the lumbar spine dated November 11th, 2019 with and without contrast   Comparison to preoperative imaging from 2018  Interval left-sided laminal foraminotomy at L5-S1  Previous disc bulge on the left at L5-S1 appears to be larger and is compressing the nerve root  There is epidural scarring around the nerve root with stable epidural scarring on the right side  There is left greater than right foraminal stenosis  No other areas of significant neural compression  Physical Exam    Vitals:  Blood pressure 121/86, pulse 76, resp  rate 16, height 5' 10" (1 778 m), weight 86 1 kg (189 lb 12 8 oz)  ,Body mass index is 27 23 kg/m²  Physical Exam   Constitutional: He is oriented to person, place, and time  He appears well-developed and well-nourished  He appears distressed  HENT:   Head: Atraumatic  Eyes: EOM are normal    Cardiovascular: Normal rate, regular rhythm and normal heart sounds  Pulmonary/Chest: Effort normal and breath sounds normal  No respiratory distress  Musculoskeletal: He exhibits no deformity  Restricted range of motion on flexion-extension lumbar spine secondary to pain  Neurological: He is alert and oriented to person, place, and time  5/5 power in lower extremities  Reports normal light touch sensation lower extremities  No clonus  Straight leg raise positive on the left  Walks with an antalgic gait favoring left leg  Skin: Skin is warm and dry  Lumbar incisions well healed  Psychiatric: He has a normal mood and affect  Vitals reviewed  Neurologic Exam     Mental Status   Oriented to person, place, and time       Cranial Nerves     CN III, IV, VI   Extraocular motions are normal

## 2019-11-19 ENCOUNTER — TELEPHONE (OUTPATIENT)
Dept: PAIN MEDICINE | Facility: CLINIC | Age: 39
End: 2019-11-19

## 2019-11-19 DIAGNOSIS — M54.16 LUMBAR RADICULOPATHY: Primary | ICD-10-CM

## 2019-11-19 DIAGNOSIS — M51.26 LUMBAR DISC HERNIATION: ICD-10-CM

## 2019-11-19 RX ORDER — CEFAZOLIN SODIUM 2 G/50ML
2000 SOLUTION INTRAVENOUS ONCE
Status: CANCELLED | OUTPATIENT
Start: 2019-11-19 | End: 2019-11-19

## 2019-11-19 RX ORDER — CHLORHEXIDINE GLUCONATE 0.12 MG/ML
15 RINSE ORAL ONCE
Status: CANCELLED | OUTPATIENT
Start: 2019-11-19 | End: 2019-11-19

## 2019-11-19 NOTE — TELEPHONE ENCOUNTER
----- Message from Aniya Bolden MD sent at 11/18/2019  1:08 PM EST -----  Regarding: RE: Follow-up  Yes  Mary pls schedule f/u accordingly  ----- Message -----  From: Brian Morfin MD  Sent: 11/15/2019   3:12 PM EST  To: Aniya Bolden MD  Subject: Follow-up                                        Patient has recurrent left lumbar radiculopathy after undergoing laminal foraminotomy on the left at L5-S1 earlier this year  I repeated an MRI of the lumbar spine with and without contrast and looks like he has an enlarging disc herniation on the left with stable postoperative changes on the right  I have discussed revision decompression versus more extensive decompression and fusion with him today  He will make his decision over the weekend  However in the interim he would be interested in following up with you to discuss a course of membrane stabilizing agents and other pain medication  Is there any way your office could reach out to him to help manage his pain in the interim?     Thanks,    Nick

## 2019-11-20 ENCOUNTER — OFFICE VISIT (OUTPATIENT)
Dept: INTERNAL MEDICINE CLINIC | Facility: CLINIC | Age: 39
End: 2019-11-20
Payer: COMMERCIAL

## 2019-11-20 ENCOUNTER — APPOINTMENT (OUTPATIENT)
Dept: LAB | Facility: CLINIC | Age: 39
End: 2019-11-20
Payer: COMMERCIAL

## 2019-11-20 VITALS
TEMPERATURE: 98.2 F | HEART RATE: 72 BPM | WEIGHT: 189 LBS | HEIGHT: 71 IN | DIASTOLIC BLOOD PRESSURE: 86 MMHG | RESPIRATION RATE: 14 BRPM | BODY MASS INDEX: 26.46 KG/M2 | SYSTOLIC BLOOD PRESSURE: 122 MMHG

## 2019-11-20 DIAGNOSIS — M54.16 LUMBAR RADICULOPATHY: ICD-10-CM

## 2019-11-20 DIAGNOSIS — M51.26 LUMBAR DISC HERNIATION: ICD-10-CM

## 2019-11-20 DIAGNOSIS — Z23 NEED FOR INFLUENZA VACCINATION: ICD-10-CM

## 2019-11-20 DIAGNOSIS — Z01.818 PRE-PROCEDURAL EXAMINATION: ICD-10-CM

## 2019-11-20 DIAGNOSIS — M47.816 LUMBAR SPONDYLOSIS: ICD-10-CM

## 2019-11-20 DIAGNOSIS — M54.16 LUMBAR RADICULOPATHY: Primary | ICD-10-CM

## 2019-11-20 DIAGNOSIS — M51.26 RECURRENT DISPLACEMENT OF LUMBAR DISC: ICD-10-CM

## 2019-11-20 PROBLEM — M54.50 LOW BACK PAIN: Status: ACTIVE | Noted: 2019-11-20

## 2019-11-20 LAB
ALBUMIN SERPL BCP-MCNC: 4.7 G/DL (ref 3.5–5)
ALP SERPL-CCNC: 58 U/L (ref 46–116)
ALT SERPL W P-5'-P-CCNC: 42 U/L (ref 12–78)
ANION GAP SERPL CALCULATED.3IONS-SCNC: 9 MMOL/L (ref 4–13)
APTT PPP: 32 SECONDS (ref 23–37)
AST SERPL W P-5'-P-CCNC: 20 U/L (ref 5–45)
BASOPHILS # BLD AUTO: 0.03 THOUSANDS/ΜL (ref 0–0.1)
BASOPHILS NFR BLD AUTO: 1 % (ref 0–1)
BILIRUB SERPL-MCNC: 0.39 MG/DL (ref 0.2–1)
BUN SERPL-MCNC: 12 MG/DL (ref 5–25)
CALCIUM SERPL-MCNC: 9.2 MG/DL (ref 8.3–10.1)
CHLORIDE SERPL-SCNC: 108 MMOL/L (ref 100–108)
CO2 SERPL-SCNC: 26 MMOL/L (ref 21–32)
CREAT SERPL-MCNC: 0.88 MG/DL (ref 0.6–1.3)
EOSINOPHIL # BLD AUTO: 0.09 THOUSAND/ΜL (ref 0–0.61)
EOSINOPHIL NFR BLD AUTO: 2 % (ref 0–6)
ERYTHROCYTE [DISTWIDTH] IN BLOOD BY AUTOMATED COUNT: 11.9 % (ref 11.6–15.1)
EST. AVERAGE GLUCOSE BLD GHB EST-MCNC: 114 MG/DL
GFR SERPL CREATININE-BSD FRML MDRD: 108 ML/MIN/1.73SQ M
GLUCOSE SERPL-MCNC: 91 MG/DL (ref 65–140)
HBA1C MFR BLD: 5.6 % (ref 4.2–6.3)
HCT VFR BLD AUTO: 47.8 % (ref 36.5–49.3)
HGB BLD-MCNC: 15.8 G/DL (ref 12–17)
IMM GRANULOCYTES # BLD AUTO: 0.02 THOUSAND/UL (ref 0–0.2)
IMM GRANULOCYTES NFR BLD AUTO: 0 % (ref 0–2)
INR PPP: 1.02 (ref 0.84–1.19)
LYMPHOCYTES # BLD AUTO: 2.59 THOUSANDS/ΜL (ref 0.6–4.47)
LYMPHOCYTES NFR BLD AUTO: 43 % (ref 14–44)
MCH RBC QN AUTO: 30.7 PG (ref 26.8–34.3)
MCHC RBC AUTO-ENTMCNC: 33.1 G/DL (ref 31.4–37.4)
MCV RBC AUTO: 93 FL (ref 82–98)
MONOCYTES # BLD AUTO: 0.48 THOUSAND/ΜL (ref 0.17–1.22)
MONOCYTES NFR BLD AUTO: 8 % (ref 4–12)
NEUTROPHILS # BLD AUTO: 2.85 THOUSANDS/ΜL (ref 1.85–7.62)
NEUTS SEG NFR BLD AUTO: 46 % (ref 43–75)
NRBC BLD AUTO-RTO: 0 /100 WBCS
PLATELET # BLD AUTO: 212 THOUSANDS/UL (ref 149–390)
PMV BLD AUTO: 11.7 FL (ref 8.9–12.7)
POTASSIUM SERPL-SCNC: 4.5 MMOL/L (ref 3.5–5.3)
PROT SERPL-MCNC: 7.6 G/DL (ref 6.4–8.2)
PROTHROMBIN TIME: 13 SECONDS (ref 11.6–14.5)
RBC # BLD AUTO: 5.15 MILLION/UL (ref 3.88–5.62)
SODIUM SERPL-SCNC: 143 MMOL/L (ref 136–145)
WBC # BLD AUTO: 6.06 THOUSAND/UL (ref 4.31–10.16)

## 2019-11-20 PROCEDURE — 90471 IMMUNIZATION ADMIN: CPT | Performed by: NURSE PRACTITIONER

## 2019-11-20 PROCEDURE — 85025 COMPLETE CBC W/AUTO DIFF WBC: CPT

## 2019-11-20 PROCEDURE — 85730 THROMBOPLASTIN TIME PARTIAL: CPT

## 2019-11-20 PROCEDURE — 99203 OFFICE O/P NEW LOW 30 MIN: CPT | Performed by: NURSE PRACTITIONER

## 2019-11-20 PROCEDURE — 80053 COMPREHEN METABOLIC PANEL: CPT

## 2019-11-20 PROCEDURE — 83036 HEMOGLOBIN GLYCOSYLATED A1C: CPT

## 2019-11-20 PROCEDURE — 36415 COLL VENOUS BLD VENIPUNCTURE: CPT

## 2019-11-20 PROCEDURE — 85610 PROTHROMBIN TIME: CPT

## 2019-11-20 PROCEDURE — 90686 IIV4 VACC NO PRSV 0.5 ML IM: CPT | Performed by: NURSE PRACTITIONER

## 2019-11-20 NOTE — PROGRESS NOTES
INTERNAL MEDICINE INITIAL OFFICE VISIT  [facility] - [facility]    NAME: Magy Becerra  AGE: 44 y o  SEX: male  : 1980     DATE: 2019     Assessment and Plan:     Problem List Items Addressed This Visit        Nervous and Auditory    Lumbar radiculopathy - Primary       Patient scheduled for surgery with Dr Tan Hayden in early December  Relevant Medications    diclofenac sodium (VOLTAREN) 1 %       Musculoskeletal and Integument    Lumbar spondylosis    Relevant Medications    diclofenac sodium (VOLTAREN) 1 %    Recurrent displacement of lumbar disc       Lumbar diskectomy scheduled by Dr Tan Hayden in early December  Lumbar disc herniation    Relevant Medications    diclofenac sodium (VOLTAREN) 1 %      Other Visit Diagnoses     Need for influenza vaccination        Relevant Orders    influenza vaccine, [id number], quadrivalent, 0 5 mL, preservative-free, for adult and pediatric patients 6 mos+ ([facility], [address], [address])          Return in about 1 year (around 2020) for Office Visit, yearly physical exam      Chief Complaint:     Chief Complaint   Patient presents with   [facility] Establish Care     new patient  History of Present Illness:       Chiquita Aleman presents to the office today to establish care  He is a 59-year-old male with no current ongoing medical conditions  The patient has been following with neuro surgery and has an upcoming surgery the  week   Dr Tan Hayden will be performing a minimally invasive left L5-S1 microdiskectomy on the patient  The patient states this will be his 3rd surgery for this condition  The patient believes he is up-to-date on all vaccines with the exception of the flu shot  He states he has traveled extensively  in the past   He does keep a record of immunizations and will check at home  He stated if he was in need of a Tdap he would contact the office    A flu shot was given to the patient at today's visit  When the patient has last seen his neurosurgeon it was suggested that he have a conversation with his primary care provider regarding pain relief with Voltaren gel  A prescription for this was sent to the patient's pharmacy, however, I did make him aware that this may not be a covered expense under his insurance carrier  I also recommended over-the-counter lidocaine patches and provided information to the patient regarding these  Blood work has been ordered by his surgeon in preparation for his upcoming surgery  I informed the patient I will contact him if anything is abnormal on those studies  He can follow up in the office in one year  for repeat physical exam       The following portions of the patient's history were reviewed and updated as appropriate: allergies, current medications, past family history, past medical history, past social history, past surgical history and problem list      Review of Systems:     Review of Systems   Constitutional: Negative  HENT: Positive for postnasal drip  Eyes: Negative  Respiratory: Negative  Cardiovascular: Negative  Gastrointestinal: Negative  Endocrine: Negative  Genitourinary: Negative  Musculoskeletal: Positive for back pain  Skin: Negative  Neurological: Negative  Hematological: Negative  Psychiatric/Behavioral: Negative           Past Medical History:     Past Medical History:   Diagnosis Date    Low back pain     Lumbosacral disc disease         Past Surgical History:     Past Surgical History:   Procedure Laterality Date    LUMBAR SPINE SURGERY  10/07/2015    Dr Ivan Manning LAMINEC/FACETECT/FORAMIN,LUMBAR 1 SEG Left 3/12/2019    Procedure: L5-S1 MINIMALLY INVASIVE LAMINAL FORAMINOTOMY AND POSSIBLE MICRODISCECTOMY WITH POSSIBLE EPIDURAL STEROID INJECTION;  Surgeon: Jon Atkinson MD;  Location: [facility];  Service: Neurosurgery        Social History:     Social History Socioeconomic History    Marital status: /Civil Union     Spouse name: None    Number of children: None    Years of education: None    Highest education level: None   Occupational History    None   Social Needs    Financial resource strain: None    Food insecurity:     Worry: None     Inability: None    Transportation needs:     Medical: None     Non-medical: None   Tobacco Use    Smoking status: Never Smoker    Smokeless tobacco: Never Used   Substance and Sexual Activity    Alcohol use: Yes     Alcohol/week: 4 0 standard drinks     Types: 4 Cans of beer per week     Frequency: 2-3 times a week     Drinks per session: 3 or 4     Binge frequency: Never    Drug use: No    Sexual activity: Yes   Lifestyle    Physical activity:     Days per week: 0 days     Minutes per session: 0 min    Stress:  To some extent   Relationships    Social connections:     Talks on phone: None     Gets together: None     Attends Confucianist service: None     Active member of club or organization: None     Attends meetings of clubs or organizations: None     Relationship status: None    Intimate partner violence:     Fear of current or ex partner: None     Emotionally abused: None     Physically abused: None     Forced sexual activity: None   Other Topics Concern    None   Social History Narrative    None         Family History:     Family History   Problem Relation Age of Onset    Lumbar disc disease Father     Asthma Father     Cancer Mother         Current Medications:     Current Outpatient Medications:     ibuprofen (MOTRIN) 200 mg tablet, Take 600 mg by mouth as needed for mild pain, Disp: , Rfl:     multivitamin (THERAGRAN) TABS, Take 1 tablet by mouth daily, Disp: , Rfl:     Omega-3 Fatty Acids (FISH OIL) 1,000 mg, Take 1,000 mg by mouth daily, Disp: , Rfl:     TURMERIC PO, Take by mouth daily, Disp: , Rfl:     diclofenac sodium (VOLTAREN) 1 %, Apply 2 g topically 4 (four) times a day, Disp: 1 Tube, Rfl: 1     Allergies:   No Known Allergies     Physical Exam:     /86 (BP Location: Left arm, Patient Position: Sitting, Cuff Size: Standard)   Pulse 72   Temp 98 2 °F (36 8 °C) (Oral)   Resp 14   Ht 5' 10 5" (1 791 m)   Wt 85 7 kg (189 lb)   BMI 26 74 kg/m²     Physical Exam   Constitutional: He is oriented to person, place, and time  He appears well-developed and well-nourished  No distress  HENT:   Head: Normocephalic and atraumatic  Eyes: Pupils are equal, round, and reactive to light  Conjunctivae and EOM are normal  Right eye exhibits no discharge  Left eye exhibits no discharge  Neck: Normal range of motion  Neck supple  Cardiovascular: Normal rate, regular rhythm, normal heart sounds and intact distal pulses  No murmur heard  Pulmonary/Chest: Effort normal and breath sounds normal  No respiratory distress  He has no wheezes  Musculoskeletal: Normal range of motion  He exhibits no edema  Neurological: He is alert and oriented to person, place, and time  Skin: Skin is warm and dry  Capillary refill takes less than 2 seconds  Psychiatric: He has a normal mood and affect  His behavior is normal  Judgment and thought content normal    Vitals reviewed  BMI Counseling: Body mass index is 26 74 kg/m²  The BMI is above normal  Nutrition recommendations include decreasing overall calorie intake  Exercise recommendations include exercising 3-5 times per week  Patient can resume regular exercise when cleared by his surgeon     Patient Instructions   lidocaine patch as needed for pain-Salon pas brand or generic brand 4%       Farida Barlow, [address] [place] [address]

## 2019-11-20 NOTE — ASSESSMENT & PLAN NOTE
Patient currently taking approximately 1200 mg of Motrin daily for his back pain  He is scheduled for upcoming surgery the 1st week of December  His surgeon recommended that he speak to his primary care provider regarding Voltaren gel  A prescription for this medication was provided to the patient today  Lidoderm patches were also recommended and information was provided to the patient regarding these

## 2019-11-27 ENCOUNTER — OFFICE VISIT (OUTPATIENT)
Dept: PAIN MEDICINE | Facility: CLINIC | Age: 39
End: 2019-11-27
Payer: COMMERCIAL

## 2019-11-27 VITALS
RESPIRATION RATE: 18 BRPM | HEIGHT: 71 IN | SYSTOLIC BLOOD PRESSURE: 118 MMHG | DIASTOLIC BLOOD PRESSURE: 84 MMHG | HEART RATE: 80 BPM | WEIGHT: 187.6 LBS | BODY MASS INDEX: 26.26 KG/M2

## 2019-11-27 DIAGNOSIS — M54.16 LUMBAR RADICULOPATHY: ICD-10-CM

## 2019-11-27 DIAGNOSIS — M51.26 RECURRENT DISPLACEMENT OF LUMBAR DISC: ICD-10-CM

## 2019-11-27 DIAGNOSIS — M51.26 LUMBAR DISC HERNIATION: Primary | ICD-10-CM

## 2019-11-27 PROCEDURE — 99214 OFFICE O/P EST MOD 30 MIN: CPT | Performed by: ANESTHESIOLOGY

## 2019-11-27 RX ORDER — GABAPENTIN 300 MG/1
300 CAPSULE ORAL 3 TIMES DAILY
Qty: 90 CAPSULE | Refills: 1 | Status: SHIPPED | OUTPATIENT
Start: 2019-11-27 | End: 2020-11-19 | Stop reason: ALTCHOICE

## 2019-11-27 NOTE — PROGRESS NOTES
Assessment:  1  Lumbar disc herniation  gabapentin (NEURONTIN) 300 mg capsule   2  Lumbar radiculopathy  gabapentin (NEURONTIN) 300 mg capsule   3  Recurrent displacement of lumbar disc  gabapentin (NEURONTIN) 300 mg capsule       Plan: This is a 51-year-old male who presents today for follow-up office visit regarding low back pain due to herniated disc at L5-S1  Patient is scheduled for surgery in 2 weeks  I will initiate gabapentin 300 mg each bedtime for 3 days, then add a m  dose for 3 days, then add afternoon dose for 3 days, and continue to titrate up in this fashion to a goal dose of 1800 mg unless significant pain relief is achieved before that or side effect occurs  Patient should decrease to the previous tolerated dose and contact our office in the event of side effect  I have reviewed with the patient that if they would like to discontinue this medication in the future they would need to wean off of it to decrease risk of seizures  The patient verbalized understanding and agreement  My impressions and treatment recommendations were discussed in detail with the patient who verbalized understanding and had no further questions  Discharge instructions were provided  I personally saw and examined the patient and I agree with the above discussed plan of care  History of Present Illness:  Raphael Jenkins is a 44 y o  male who presents for a follow up office visit in regards to Back Pain  The patients current symptoms include constant sharp pressure-like shooting low back pain  Patient rates his pain 7 5/10 on the pain scale  Patient had an MRI of the lumbosacral spine which demonstrates L5-S1 disc herniation  Patient is scheduled to have surgery with Dr Pam Urias  He takes ibuprofen as needed  I have personally reviewed and/or updated the patient's past medical history, past surgical history, family history, social history, current medications, allergies, and vital signs today  Review of Systems   Respiratory: Negative for shortness of breath  Cardiovascular: Negative for chest pain  Gastrointestinal: Negative for constipation, diarrhea, nausea and vomiting  Musculoskeletal: Positive for gait problem  Negative for arthralgias, joint swelling and myalgias  Skin: Negative for rash  Neurological: Negative for dizziness, seizures and weakness  All other systems reviewed and are negative  Patient Active Problem List   Diagnosis    Status post lumbar discectomy    Lumbar radiculopathy    Lumbar spondylosis    Recurrent displacement of lumbar disc    Lumbar disc herniation    Low back pain       Past Medical History:   Diagnosis Date    Low back pain     Lumbosacral disc disease        Past Surgical History:   Procedure Laterality Date    LUMBAR SPINE SURGERY  10/07/2015    Dr Kiya Rodriguez LAMINEC/FACETECT/FORAMIN,LUMBAR 1 SEG Left 3/12/2019    Procedure: L5-S1 MINIMALLY INVASIVE LAMINAL FORAMINOTOMY AND POSSIBLE MICRODISCECTOMY WITH POSSIBLE EPIDURAL STEROID INJECTION;  Surgeon: Bonnie Vergara MD;  [facility];  Service: Neurosurgery       Family History   Problem Relation Age of Onset    Lumbar disc disease Father     Asthma Father     Cancer Mother        Social History     Occupational History    Not on file   Tobacco Use    Smoking status: Never Smoker    Smokeless tobacco: Never Used   Substance and Sexual Activity    Alcohol use:  Yes     Alcohol/week: 4 0 standard drinks     Types: 4 Cans of beer per week     Frequency: 2-3 times a week     Drinks per session: 3 or 4     Binge frequency: Never    Drug use: No    Sexual activity: Yes       Current Outpatient Medications on File Prior to Visit   Medication Sig    diclofenac sodium (VOLTAREN) 1 % Apply 2 g topically 4 (four) times a day    ibuprofen (MOTRIN) 200 mg tablet Take 600 mg by mouth as needed for mild pain    multivitamin (THERAGRAN) TABS Take 1 tablet by mouth daily    Omega-3 Fatty Acids (FISH OIL) 1,000 mg Take 1,000 mg by mouth daily    TURMERIC PO Take by mouth daily     No current facility-administered medications on file prior to visit  No Known Allergies    Physical Exam:    Resp 18   Ht 5' 10 5" (1 791 m)   Wt 85 1 kg (187 lb 9 6 oz)   BMI 26 54 kg/m²     Constitutional:normal, well developed, well nourished, alert, in no distress and non-toxic and no overt pain behavior  Eyes:anicteric  HEENT:grossly intact  Neck:supple, symmetric, trachea midline and no masses   Pulmonary:even and unlabored  Cardiovascular:No edema or pitting edema present  Skin:Normal without rashes or lesions and well hydrated  Psychiatric:Mood and affect appropriate  Neurologic:Cranial Nerves II-XII grossly intact  Musculoskeletal:normal    Imaging  LUMBAR SPINE     INDICATION:   Z98 890: Other specified postprocedural states  M47 816: Spondylosis without myelopathy or radiculopathy, lumbar region  M54 16: Radiculopathy, lumbar region      COMPARISON:  3/12/2019     VIEWS:  XR LUMBAR SP/BENDING ONLY MIN 2-3 V  Images: 4     FINDINGS:     There are 5 non rib bearing lumbar vertebral bodies       There is no evidence of acute fracture or destructive osseous lesion      Minimal grade 1 degenerative retrolisthesis L5-S1, not conspicuous previously  No evidence of dynamic instability     Mild degenerative spondylosis again evident L5-S1     The pedicles appear intact      Soft tissues are unremarkable      IMPRESSION:  Mild degenerative spondylosis L5-S1     Minimal retrolisthesis L5-S1 without evidence of dynamic instability       MRI LUMBAR SPINE WITH AND WITHOUT CONTRAST     INDICATION: Z98 890: Other specified postprocedural states  M47 816: Spondylosis without myelopathy or radiculopathy, lumbar region  M54 16: Radiculopathy, lumbar region  L5-S1 MINIMALLY INVASIVE LAMINAL FORAMINOTOMY AND POSSIBLE MICRODISCECTOMY WITH POSSIBLE EPIDURAL STEROID INJECTION  March 2019    Low back pain with left-sided sciatica      COMPARISON:  11/11/2019; 11/26/2018; operative report 3/12/2019     TECHNIQUE:  Sagittal T1, sagittal T2, sagittal inversion recovery, axial T1 and axial T2, coronal T2  Sagittal and axial T1 postcontrast      IV Contrast:  8 mL of gadobutrol injection (MULTI-DOSE)      IMAGE QUALITY:  Diagnostic     FINDINGS:     VERTEBRAL BODIES:  VERTEBRAL BODIES:  There are 5 lumbar type vertebral bodies  Normal alignment of the lumbar spine  No spondylolysis or spondylolisthesis  Minimal levoscoliosis mid lumbar spine may be positional   No compression fracture  Scattered degenerative endplate changes  No focally suspicious marrow lesions  No bone marrow edema or compression abnormality      SACRUM:  Scattered degenerative endplate changes  No focally suspicious marrow lesions  No bone marrow edema or compression abnormality      DISTAL CORD AND CONUS:  Normal size and signal within the distal cord and conus  Conus medullaris terminates at the superior endplate of L1     PARASPINAL SOFT TISSUES:  Paraspinal soft tissues are unremarkable      LOWER THORACIC DISC SPACES:  Normal disc height and signal   No disc herniation, canal stenosis or foraminal narrowing      LUMBAR DISC SPACES:     L1-L2:  Normal      L2-L3:  Normal      L3-L4:  Normal      L4-L5:  Normal      L5-S1:  Persistent nonenhancing right-sided herniated disc material again demonstrated extending into the right neural foramen  New left hemilaminotomy defect in L5 noted  Probable prior right hemilaminotomy defect  Persistent nonenhancing soft tissue   extends into the right neural foramen  Moderate residual right neural foraminal narrowing  Central canal patent  Mild enhancing scar tissue in the left subarticular recess and laminectomy defect     Mild left neural foraminal narrowing        POSTCONTRAST IMAGING: Interval left hemilaminotomy with enhancing scar tissue noted        IMPRESSION:        1    Interval left hemilaminotomy L5-S1 with enhancing scar tissue in the operative bed and along the posterior margin of the intervertebral disc in the left subarticular recess    Persistent residual right-sided disc herniation L5-S1 unchanged from the   preoperative MRI

## 2019-12-02 NOTE — PRE-PROCEDURE INSTRUCTIONS
Pre-Surgery Instructions:   Medication Instructions    diclofenac sodium (VOLTAREN) 1 % Instructed patient per Anesthesia Guidelines   gabapentin (NEURONTIN) 300 mg capsule Instructed patient per Anesthesia Guidelines   ibuprofen (MOTRIN) 200 mg tablet Instructed patient per Anesthesia Guidelines   multivitamin (THERAGRAN) TABS Instructed patient per Anesthesia Guidelines     Pre op,medications and showering instructions reviewed per Dr Stanley Moreira has nestoriclens per

## 2019-12-10 ENCOUNTER — ANESTHESIA EVENT (OUTPATIENT)
Dept: PERIOP | Facility: HOSPITAL | Age: 39
End: 2019-12-10
Payer: COMMERCIAL

## 2019-12-10 ENCOUNTER — DOCUMENTATION (OUTPATIENT)
Dept: NEUROSURGERY | Facility: CLINIC | Age: 39
End: 2019-12-10

## 2019-12-10 NOTE — ANESTHESIA PREPROCEDURE EVALUATION
Review of Systems/Medical History  Patient summary reviewed  Chart reviewed  No history of anesthetic complications     Cardiovascular  Negative cardio ROS    Pulmonary  Negative pulmonary ROS        GI/Hepatic  Negative GI/hepatic ROS          Negative  ROS        Endo/Other  Negative endo/other ROS      GYN       Hematology  Negative hematology ROS      Musculoskeletal  Back pain , lumbar pain, Sciatica,   Arthritis     Neurology  Negative neurology ROS      Psychology   Negative psychology ROS              Physical Exam    Airway    Mallampati score: II  TM Distance: >3 FB  Neck ROM: full     Dental   No notable dental hx     Cardiovascular  Comment: Negative ROS, Rhythm: regular, Rate: normal, Cardiovascular exam normal    Pulmonary  Pulmonary exam normal Breath sounds clear to auscultation,     Other Findings        Anesthesia Plan  ASA Score- 2     Anesthesia Type- general with ASA Monitors  Additional Monitors:   Airway Plan: ETT  Plan Factors-    Induction- intravenous  Postoperative Plan- Plan for postoperative opioid use  Informed Consent- Anesthetic plan and risks discussed with patient  I personally reviewed this patient with the CRNA  Discussed and agreed on the Anesthesia Plan with the CRNA  Jesus Crenshaw Recent labs personally reviewed:  Lab Results   Component Value Date    WBC 6 06 11/20/2019    HGB 15 8 11/20/2019     11/20/2019     Lab Results   Component Value Date    K 4 5 11/20/2019    BUN 12 11/20/2019    CREATININE 0 88 11/20/2019     Lab Results   Component Value Date    PTT 32 11/20/2019      Lab Results   Component Value Date    INR 1 02 11/20/2019     Lab Results   Component Value Date    HGBA1C 5 6 11/20/2019       I, Felix Aguilar MD, have personally seen and evaluated the patient prior to anesthetic care  I have reviewed the pre-anesthetic record, and other medical records if appropriate to the anesthetic care    If a CRNA is involved in the case, I have reviewed the CRNA assessment, if present, and agree  Risks/benefits and alternatives discussed with patient including possible PONV, sore throat, and possibility of rare anesthetic and surgical emergencies

## 2019-12-11 ENCOUNTER — ANESTHESIA (OUTPATIENT)
Dept: PERIOP | Facility: HOSPITAL | Age: 39
End: 2019-12-11
Payer: COMMERCIAL

## 2019-12-11 ENCOUNTER — HOSPITAL ENCOUNTER (OUTPATIENT)
Facility: HOSPITAL | Age: 39
Setting detail: OUTPATIENT SURGERY
Discharge: HOME | End: 2019-12-11
Attending: NEUROLOGICAL SURGERY | Admitting: NEUROLOGICAL SURGERY
Payer: COMMERCIAL

## 2019-12-11 ENCOUNTER — APPOINTMENT (OUTPATIENT)
Dept: RADIOLOGY | Facility: HOSPITAL | Age: 39
End: 2019-12-11
Payer: COMMERCIAL

## 2019-12-11 VITALS
RESPIRATION RATE: 18 BRPM | HEART RATE: 75 BPM | HEIGHT: 70 IN | WEIGHT: 187 LBS | BODY MASS INDEX: 26.77 KG/M2 | TEMPERATURE: 97.8 F | DIASTOLIC BLOOD PRESSURE: 70 MMHG | SYSTOLIC BLOOD PRESSURE: 131 MMHG | OXYGEN SATURATION: 97 %

## 2019-12-11 DIAGNOSIS — M54.16 LUMBAR RADICULOPATHY: ICD-10-CM

## 2019-12-11 DIAGNOSIS — M51.26 LUMBAR DISC HERNIATION: Primary | ICD-10-CM

## 2019-12-11 LAB
GLUCOSE SERPL-MCNC: 131 MG/DL (ref 65–140)
GLUCOSE SERPL-MCNC: 98 MG/DL (ref 65–140)

## 2019-12-11 PROCEDURE — 72100 X-RAY EXAM L-S SPINE 2/3 VWS: CPT

## 2019-12-11 PROCEDURE — 82948 REAGENT STRIP/BLOOD GLUCOSE: CPT

## 2019-12-11 PROCEDURE — 63030 LAMOT DCMPRN NRV RT 1 LMBR: CPT | Performed by: NEUROLOGICAL SURGERY

## 2019-12-11 RX ORDER — METHYLPREDNISOLONE ACETATE 80 MG/ML
INJECTION, SUSPENSION INTRA-ARTICULAR; INTRALESIONAL; INTRAMUSCULAR; SOFT TISSUE AS NEEDED
Status: DISCONTINUED | OUTPATIENT
Start: 2019-12-11 | End: 2019-12-11 | Stop reason: HOSPADM

## 2019-12-11 RX ORDER — HYDROMORPHONE HCL/PF 1 MG/ML
0.5 SYRINGE (ML) INJECTION
Status: DISCONTINUED | OUTPATIENT
Start: 2019-12-11 | End: 2019-12-11 | Stop reason: HOSPADM

## 2019-12-11 RX ORDER — ACETAMINOPHEN 325 MG/1
975 TABLET ORAL ONCE
Status: COMPLETED | OUTPATIENT
Start: 2019-12-11 | End: 2019-12-11

## 2019-12-11 RX ORDER — SODIUM CHLORIDE 9 MG/ML
125 INJECTION, SOLUTION INTRAVENOUS CONTINUOUS
Status: DISCONTINUED | OUTPATIENT
Start: 2019-12-11 | End: 2019-12-11 | Stop reason: HOSPADM

## 2019-12-11 RX ORDER — CEFAZOLIN SODIUM 2 G/50ML
2000 SOLUTION INTRAVENOUS ONCE
Status: COMPLETED | OUTPATIENT
Start: 2019-12-11 | End: 2019-12-11

## 2019-12-11 RX ORDER — MORPHINE SULFATE 4 MG/ML
1 INJECTION, SOLUTION INTRAMUSCULAR; INTRAVENOUS
Status: DISCONTINUED | OUTPATIENT
Start: 2019-12-11 | End: 2019-12-11 | Stop reason: HOSPADM

## 2019-12-11 RX ORDER — BUPIVACAINE HYDROCHLORIDE AND EPINEPHRINE 5; 5 MG/ML; UG/ML
INJECTION, SOLUTION EPIDURAL; INTRACAUDAL; PERINEURAL AS NEEDED
Status: DISCONTINUED | OUTPATIENT
Start: 2019-12-11 | End: 2019-12-11 | Stop reason: HOSPADM

## 2019-12-11 RX ORDER — DEXAMETHASONE SODIUM PHOSPHATE 10 MG/ML
INJECTION, SOLUTION INTRAMUSCULAR; INTRAVENOUS AS NEEDED
Status: DISCONTINUED | OUTPATIENT
Start: 2019-12-11 | End: 2019-12-11 | Stop reason: SURG

## 2019-12-11 RX ORDER — OXYCODONE HYDROCHLORIDE AND ACETAMINOPHEN 5; 325 MG/1; MG/1
1 TABLET ORAL EVERY 4 HOURS PRN
Status: DISCONTINUED | OUTPATIENT
Start: 2019-12-11 | End: 2019-12-11 | Stop reason: HOSPADM

## 2019-12-11 RX ORDER — MIDAZOLAM HYDROCHLORIDE 2 MG/2ML
INJECTION, SOLUTION INTRAMUSCULAR; INTRAVENOUS AS NEEDED
Status: DISCONTINUED | OUTPATIENT
Start: 2019-12-11 | End: 2019-12-11 | Stop reason: SURG

## 2019-12-11 RX ORDER — LIDOCAINE HYDROCHLORIDE AND EPINEPHRINE 10; 10 MG/ML; UG/ML
INJECTION, SOLUTION INFILTRATION; PERINEURAL AS NEEDED
Status: DISCONTINUED | OUTPATIENT
Start: 2019-12-11 | End: 2019-12-11 | Stop reason: HOSPADM

## 2019-12-11 RX ORDER — ONDANSETRON 2 MG/ML
4 INJECTION INTRAMUSCULAR; INTRAVENOUS ONCE AS NEEDED
Status: DISCONTINUED | OUTPATIENT
Start: 2019-12-11 | End: 2019-12-11 | Stop reason: HOSPADM

## 2019-12-11 RX ORDER — NEOSTIGMINE METHYLSULFATE 1 MG/ML
INJECTION INTRAVENOUS AS NEEDED
Status: DISCONTINUED | OUTPATIENT
Start: 2019-12-11 | End: 2019-12-11 | Stop reason: SURG

## 2019-12-11 RX ORDER — ONDANSETRON 2 MG/ML
INJECTION INTRAMUSCULAR; INTRAVENOUS AS NEEDED
Status: DISCONTINUED | OUTPATIENT
Start: 2019-12-11 | End: 2019-12-11 | Stop reason: SURG

## 2019-12-11 RX ORDER — SODIUM CHLORIDE, SODIUM LACTATE, POTASSIUM CHLORIDE, CALCIUM CHLORIDE 600; 310; 30; 20 MG/100ML; MG/100ML; MG/100ML; MG/100ML
INJECTION, SOLUTION INTRAVENOUS CONTINUOUS PRN
Status: DISCONTINUED | OUTPATIENT
Start: 2019-12-11 | End: 2019-12-11 | Stop reason: SURG

## 2019-12-11 RX ORDER — CHLORHEXIDINE GLUCONATE 0.12 MG/ML
15 RINSE ORAL ONCE
Status: COMPLETED | OUTPATIENT
Start: 2019-12-11 | End: 2019-12-11

## 2019-12-11 RX ORDER — FENTANYL CITRATE/PF 50 MCG/ML
50 SYRINGE (ML) INJECTION
Status: DISCONTINUED | OUTPATIENT
Start: 2019-12-11 | End: 2019-12-11 | Stop reason: HOSPADM

## 2019-12-11 RX ORDER — OXYCODONE HYDROCHLORIDE AND ACETAMINOPHEN 5; 325 MG/1; MG/1
1 TABLET ORAL EVERY 6 HOURS PRN
Qty: 8 TABLET | Refills: 0 | Status: SHIPPED | OUTPATIENT
Start: 2019-12-11 | End: 2019-12-13

## 2019-12-11 RX ORDER — MAGNESIUM HYDROXIDE 1200 MG/15ML
LIQUID ORAL AS NEEDED
Status: DISCONTINUED | OUTPATIENT
Start: 2019-12-11 | End: 2019-12-11 | Stop reason: HOSPADM

## 2019-12-11 RX ORDER — PROPOFOL 10 MG/ML
INJECTION, EMULSION INTRAVENOUS AS NEEDED
Status: DISCONTINUED | OUTPATIENT
Start: 2019-12-11 | End: 2019-12-11 | Stop reason: SURG

## 2019-12-11 RX ORDER — DIPHENHYDRAMINE HYDROCHLORIDE 50 MG/ML
12.5 INJECTION INTRAMUSCULAR; INTRAVENOUS ONCE AS NEEDED
Status: DISCONTINUED | OUTPATIENT
Start: 2019-12-11 | End: 2019-12-11 | Stop reason: HOSPADM

## 2019-12-11 RX ORDER — METOCLOPRAMIDE HYDROCHLORIDE 5 MG/ML
10 INJECTION INTRAMUSCULAR; INTRAVENOUS ONCE AS NEEDED
Status: DISCONTINUED | OUTPATIENT
Start: 2019-12-11 | End: 2019-12-11 | Stop reason: HOSPADM

## 2019-12-11 RX ORDER — FENTANYL CITRATE 50 UG/ML
INJECTION, SOLUTION INTRAMUSCULAR; INTRAVENOUS AS NEEDED
Status: DISCONTINUED | OUTPATIENT
Start: 2019-12-11 | End: 2019-12-11 | Stop reason: SURG

## 2019-12-11 RX ORDER — ROCURONIUM BROMIDE 10 MG/ML
INJECTION, SOLUTION INTRAVENOUS AS NEEDED
Status: DISCONTINUED | OUTPATIENT
Start: 2019-12-11 | End: 2019-12-11 | Stop reason: SURG

## 2019-12-11 RX ORDER — KETOROLAC TROMETHAMINE 30 MG/ML
INJECTION, SOLUTION INTRAMUSCULAR; INTRAVENOUS AS NEEDED
Status: DISCONTINUED | OUTPATIENT
Start: 2019-12-11 | End: 2019-12-11 | Stop reason: SURG

## 2019-12-11 RX ORDER — ONDANSETRON 2 MG/ML
4 INJECTION INTRAMUSCULAR; INTRAVENOUS EVERY 6 HOURS PRN
Status: DISCONTINUED | OUTPATIENT
Start: 2019-12-11 | End: 2019-12-11 | Stop reason: HOSPADM

## 2019-12-11 RX ORDER — GLYCOPYRROLATE 0.2 MG/ML
INJECTION INTRAMUSCULAR; INTRAVENOUS AS NEEDED
Status: DISCONTINUED | OUTPATIENT
Start: 2019-12-11 | End: 2019-12-11 | Stop reason: SURG

## 2019-12-11 RX ORDER — HYDROMORPHONE HCL/PF 1 MG/ML
0.2 SYRINGE (ML) INJECTION
Status: DISCONTINUED | OUTPATIENT
Start: 2019-12-11 | End: 2019-12-11 | Stop reason: HOSPADM

## 2019-12-11 RX ORDER — IBUPROFEN 400 MG/1
400 TABLET ORAL EVERY 6 HOURS PRN
Status: DISCONTINUED | OUTPATIENT
Start: 2019-12-11 | End: 2019-12-11 | Stop reason: HOSPADM

## 2019-12-11 RX ORDER — GABAPENTIN 300 MG/1
300 CAPSULE ORAL ONCE
Status: COMPLETED | OUTPATIENT
Start: 2019-12-11 | End: 2019-12-11

## 2019-12-11 RX ORDER — LIDOCAINE HYDROCHLORIDE 10 MG/ML
0.5 INJECTION, SOLUTION EPIDURAL; INFILTRATION; INTRACAUDAL; PERINEURAL ONCE AS NEEDED
Status: DISCONTINUED | OUTPATIENT
Start: 2019-12-11 | End: 2019-12-11 | Stop reason: HOSPADM

## 2019-12-11 RX ADMIN — NEOSTIGMINE METHYLSULFATE 3 MG: 1 INJECTION INTRAVENOUS at 09:09

## 2019-12-11 RX ADMIN — CHLORHEXIDINE GLUCONATE 0.12% ORAL RINSE 15 ML: 1.2 LIQUID ORAL at 07:05

## 2019-12-11 RX ADMIN — ACETAMINOPHEN 975 MG: 325 TABLET, FILM COATED ORAL at 07:26

## 2019-12-11 RX ADMIN — KETOROLAC TROMETHAMINE 30 MG: 30 INJECTION, SOLUTION INTRAMUSCULAR at 09:15

## 2019-12-11 RX ADMIN — ROCURONIUM BROMIDE 40 MG: 10 SOLUTION INTRAVENOUS at 07:37

## 2019-12-11 RX ADMIN — DEXAMETHASONE SODIUM PHOSPHATE 4 MG: 10 INJECTION, SOLUTION INTRAMUSCULAR; INTRAVENOUS at 07:44

## 2019-12-11 RX ADMIN — OXYCODONE HYDROCHLORIDE AND ACETAMINOPHEN 1 TABLET: 5; 325 TABLET ORAL at 11:04

## 2019-12-11 RX ADMIN — CEFAZOLIN SODIUM 2000 MG: 2 SOLUTION INTRAVENOUS at 07:18

## 2019-12-11 RX ADMIN — GLYCOPYRROLATE 0.4 MG: 0.2 INJECTION, SOLUTION INTRAMUSCULAR; INTRAVENOUS at 09:09

## 2019-12-11 RX ADMIN — ONDANSETRON 4 MG: 2 INJECTION INTRAMUSCULAR; INTRAVENOUS at 09:07

## 2019-12-11 RX ADMIN — PROPOFOL 200 MG: 10 INJECTION, EMULSION INTRAVENOUS at 07:37

## 2019-12-11 RX ADMIN — SODIUM CHLORIDE, SODIUM LACTATE, POTASSIUM CHLORIDE, AND CALCIUM CHLORIDE: .6; .31; .03; .02 INJECTION, SOLUTION INTRAVENOUS at 07:34

## 2019-12-11 RX ADMIN — GABAPENTIN 300 MG: 300 CAPSULE ORAL at 07:26

## 2019-12-11 RX ADMIN — FENTANYL CITRATE 50 MCG: 50 INJECTION, SOLUTION INTRAMUSCULAR; INTRAVENOUS at 09:38

## 2019-12-11 RX ADMIN — FENTANYL CITRATE 100 MCG: 50 INJECTION INTRAMUSCULAR; INTRAVENOUS at 07:37

## 2019-12-11 RX ADMIN — FENTANYL CITRATE 100 MCG: 50 INJECTION INTRAMUSCULAR; INTRAVENOUS at 08:17

## 2019-12-11 RX ADMIN — LIDOCAINE HYDROCHLORIDE 100 MG: 20 INJECTION, SOLUTION INTRAVENOUS at 07:37

## 2019-12-11 RX ADMIN — MIDAZOLAM HYDROCHLORIDE 2 MG: 1 INJECTION, SOLUTION INTRAMUSCULAR; INTRAVENOUS at 07:31

## 2019-12-11 NOTE — OP NOTE
OPERATIVE REPORT  PATIENT NAME: Gwen Iqbal    :  1980  MRN: [id number]  Pt Location: [facility] 01    SURGERY DATE: 2019    Surgeon(s) and Role:     * Kayli Hensley MD - Primary    No assistance  No qualified residents available  Preop Diagnosis:  Lumbar radiculopathy [M54 16]  Lumbar disc herniation [M51 26]    Post-Op Diagnosis Codes:     * Lumbar radiculopathy [M54 16]     * Lumbar disc herniation [M51 26]    Procedure:  1  Reopening of lumbar incision for left L5-S1 minimally invasive medial facetectomy and microdiskectomy and epidural steroid injection  2  Operating microscope for microdissection  Specimen(s):  * No specimens in log *    Estimated Blood Loss:   Minimal    Drains:  * No LDAs found *    Anesthesia Type:   General    Operative Indications:  Lumbar radiculopathy [M54 16]  Lumbar disc herniation [M51 26]    Operative Findings:  Large left L5-S1 disc herniation impinging on the traversing nerve root  Complications:   None    Procedure and Technique:  Clinical Note:    The goals and alternatives to the procedure described above were discussed with patient and family  Surgery is intended to decompress neural structures and hopefully improve radicular pain  Weakness, numbness and back pain are less likely to improve  The risks of surgery were described in detail  1  Risk of general anesthetic, with possible cardiac and respiratory complication  There is risk of infection and bleeding  2  Risk of neurological injury with new pain, weakness or numbness or difficulties with bowel and bladder function  The risk of CSF leak was described  3  Possible need for revision surgery in the future  Once all questions were answered to their satisfaction, they asked to proceed with surgery  Operating Room Note    The patient was brought to the operating room and placed under general anesthetic   Once all appropriate lines were in position, the patient was carefully turned in the prone position onto a Ross frame  Care was taken to ensure that all pressure points were padded and the neck was in a neutral position  AP and lateral fluoroscopy were used to identify midline of the lumbar spine  A mini surgical timeout was undertaken identifying site side and level of surgery  The left the midline was prepped with alcohol swab  A spinal needle was placed under lateral fluoroscopic guidance through the patient's previous left-sided incision demonstrated appropriate trajectory to the appropriate lumbar interspace  One percent lidocaine with 100,000 of epinephrine was used to infiltrate the soft tissue as the spinal needle was removed  A 2 cm incision was planned based on the entry point of the needle  The skin was then prepped and draped in usual sterile fashion  A full surgical timeout was undertaken identifying the site, side and level of surgery  The patient received preoperative antibiotics  10 blade was used to incise the skin  Monopolar cautery was used to dissect down and through the muscle fascia  A series of Metrx dilators were placed and a final Metrix retractor was placed with lateral fluoroscopy confirming the L5/S1 level  This was also confirmed radiology  The operating microscope was then brought in for microdissection  Under microscopic magnification, monopolar cautery was used to remove the soft tissue off the lamina  The previous laminotomy site was identified  Straight curette was used to dissect along the medial aspect of the facet joint  Match stick bur and Kerrison was used to perform a medial facetectomy  I could then identify the lateral aspect of the thecal sac and some epidural scar  Bipolar cautery was used for hemostasis  With a nerve root retracted behind a suction retractor, 15 blade was used to incise the annulus  I was able to deliver few small fragments of disc material with pituitary rongeur    Ball hook then revealed a large disc fragment which extruded behind the S1 vertebral body  This was removed with pituitary rongeur and there was relaxation of the nerve root and thecal sac  Additional large fragments of disc material were then removed for more medially  Afterwards I could pass a ball hook along the ventral surface of the thecal sac and along the course of the S1 nerve root in the lateral recess and there is no further compression  Valsalva maneuver demonstrated no aggressive CSF  This space  was used to identify loose fragments which was then removed with pituitary rongeur  Bipolar cautery and Surgiflo were used for hemostasis  Excess Surgiflo was removed  The surgical site was irrigated with antibiotic irrigation  40 mg of Depo-Medrol then placed in the epidural space  The Metrx retractor was removed any bleeding from soft tissue was cauterized with monopolar and bipolar cautery  The surgical site was irrigated copiously with antibiotic irrigation  Vicryl suture was used to approximate the fascia and subcutaneous tissue  Nylon suture was used to approximate the skin edges  0 5% Marcaine with 1/100,000 of epinephrine was used to infiltrate the soft tissue  A Miplex was applied  The count was correct at the end of the case and there were no complications  The patient was carefully transferred onto the operating gurney and extubated  The patient was transferred to the PACU where they were noted to be hemodynamically stable and neurologically unchanged  The results of surgery were discussed with patient and family      I was present for the entire procedure    Patient Disposition:  PACU     SIGNATURE: Tank Dewitt MD  DATE: December 11, 2019  TIME: 9:43 AM

## 2019-12-11 NOTE — DISCHARGE INSTRUCTIONS
Spine Day Surgery Discharge Instructions    Activity:    1  Do not lift more than 20 pounds for 2 weeks  Surgical incision care:    1  Keep dressing in place for 3 days  2  Keep incision dry for 3 days  3  May allow clean water to flow over incision after 3 days  4  Do not immerse the incision in water for 4 weeks  5  After 3 days, incision may be left open to air, but should remain clean  6  Do not apply any creams or ointments to the incision, unless otherwise instructed by [address] [place] [address]  7  Contact office if increasing redness, drainage, pain or swelling around the incision  Postoperative medication:    1  [facility] will provide pain medication for the first 2 weeks after surgery as coordinated with your pain specialist    2  If [facility] is providing pain medication, please contact office for questions regarding dosage and modifications  3  If [facility] is not providing pain medication postoperatively, then contact pain specialist for additional instructions and prescriptions  4  Resume antiplatelet/anticoagulation medication 2 days after surgery, unless you notice new neurological symptoms or bleeding from incision  5  Do not operate heavy machinery or vehicles while taking sedating medications

## 2019-12-11 NOTE — INTERVAL H&P NOTE
H&P reviewed  After examining the patient I find no changes in the patients condition since the H&P had been written  Patient personally seen and examined  Neurological examination unchanged compared to last office/progress note, with the following exceptions:    /86   Pulse 84   Temp 98 1 °F (36 7 °C) (Temporal)   Resp 18   Ht 5' 10" (1 778 m)   Wt 84 8 kg (187 lb)   SpO2 96%   BMI 26 83 kg/m²      None  Continues to have left-sided leg pain  Has stopped all antiplatelet/anticoagulation medication as instructed  Post operative instructions and medications have been reviewed with the patient and family  Assessment and Plan:    All questions have been answered to the patient and family satisfaction  Plan to proceed with reopening of lumbar incision for left L5-S1 minimally invasive microdiskectomy and possible epidural steroid injection  They are in agreement with proceeding

## 2019-12-12 ENCOUNTER — TELEPHONE (OUTPATIENT)
Dept: NEUROSURGERY | Facility: CLINIC | Age: 39
End: 2019-12-12

## 2019-12-12 NOTE — TELEPHONE ENCOUNTER
Called patient to provide post op call  Patient did not answer  Left a voice message with my direct line for him to call back

## 2019-12-12 NOTE — TELEPHONE ENCOUNTER
Called Boyd Lover to follow up on patient POD 1  Patient reports that he is doing very well overall and denies any incisional issues or fevers  Patient denies any bleeding, dizziness, fever, redness, significant pain and swelling  States that he has very little to no back pain and that his leg is feeling much better since the surgery  Verified date/time/location of his upcoming POV on 12/23/19 and advised him to call the office with any further questions or concerns, or if any incisional issues or fevers would arise  Patient received and does understand the discharge instructions  Reviewed incision care with Patient and he has no further questions at this time  Patient was appreciative for the call

## 2019-12-23 ENCOUNTER — OFFICE VISIT (OUTPATIENT)
Dept: NEUROSURGERY | Facility: CLINIC | Age: 39
End: 2019-12-23

## 2019-12-23 VITALS
HEART RATE: 84 BPM | RESPIRATION RATE: 16 BRPM | HEIGHT: 70 IN | TEMPERATURE: 98.7 F | SYSTOLIC BLOOD PRESSURE: 114 MMHG | DIASTOLIC BLOOD PRESSURE: 76 MMHG | BODY MASS INDEX: 26.77 KG/M2 | WEIGHT: 187 LBS

## 2019-12-23 DIAGNOSIS — Z48.89 AFTERCARE FOLLOWING SURGERY FOR INJURY AND TRAUMA: Primary | ICD-10-CM

## 2019-12-23 PROCEDURE — 99024 POSTOP FOLLOW-UP VISIT: CPT | Performed by: PHYSICIAN ASSISTANT

## 2019-12-23 NOTE — PATIENT INSTRUCTIONS
Continue with restricted activities, specifically avoid lifting, pushing or pulling greater than 10 lb  Ambulation as tolerated is encouraged  Avoid impact activities  Avoid immersion  in water such as swimming, hot tubs or tub bath for another 2 weeks  Keep planned follow-up with Dr Kaylan Jensen (1/21/20)  Call or return sooner should you notice any redness, swelling, drainage or increased pain 8 year incision site

## 2019-12-23 NOTE — PROGRESS NOTES
Patient ID: Bony Brooks is a 44 y o  male  Diagnoses and all orders for this visit:    Aftercare following surgery for injury and trauma          Assessment/Plan:    Very pleasant 27-year-old male, returns for 2 week postoperative follow-up  He is status post: reopening of lumbar incision for minimally invasive left L5-S1 microdiskectomy and epidural steroid injection , 12/11/19, by Dr Thomas Munoz  He is very pleased with surgical outcome he reports significant decrease in his left leg and right leg pain  Reports when, upright (standing) or ambulating he is essentially pain-free  He does have some discomfort when recumbent at times  He otherwise denies gait or balance disturbance, motor or sensory difficulties in the lower extremities, bowel or bladder incontinence, perineal anesthesia  He understands to continue with restricted activities, specifically avoid lifting, pushing or pulling greater than 10 lb  And also to avoid impact type activities  He also understands he should avoid immersionin water such as swimming, hot tubs or tub bath  He also understands should he notice any changes incision, redness, swelling, increased pain, drainage he should return call and return to Neurosurgery sooner for reassessment  There is no imaging for review today  On examination today motor exam of the lower extremities is 5 x 5 for power, reflexes are intact and symmetric, sensation is also grossly intact  He has a postop follow-up with Dr Thomas Munoz in approximately 4 weeks  These findings, impressions  and recommendations are reviewed in great detail with the patient, he expressed understanding and agreement, his questions were answered completely and to his satisfaction  Return in about 4 weeks (around 1/20/2020) for Postoperative follow-up with Dr Thomas Munoz      Chief Complaint  Postoperative follow-up 2 weeks    HPI       The following portions of the patient's history were reviewed and updated as appropriate: allergies, current medications, past family history, past medical history, past social history and past surgical history  Review of Systems   Constitutional: Negative  HENT: Negative  Eyes: Negative  Respiratory: Negative  Cardiovascular: Negative  Gastrointestinal: Negative  Endocrine: Negative  Genitourinary: Negative  Musculoskeletal: Negative  Skin: Negative  Allergic/Immunologic: Negative  Neurological: Negative  Hematological: Negative  Psychiatric/Behavioral: Negative  All other systems reviewed and are negative  Objective:    Physical Exam   Constitutional: He is oriented to person, place, and time  He appears well-developed and well-nourished  HENT:   Head: Normocephalic and atraumatic  Eyes: Pupils are equal, round, and reactive to light  EOM are normal    Cardiovascular: Normal rate  Pulmonary/Chest: Effort normal and breath sounds normal    Musculoskeletal:   Lower extremities:    He is nontender to palpation over the cast, there is no erythema, or masses  Homans sign is negative  No evidence DVT   Neurological: He is alert and oriented to person, place, and time  Skin: Skin is warm and dry  Surgical incision:    Midline lumbar, approximately 2 cm in length, closed with running nylon suture which was removed without complication, wound is well healed, there is no erythema, edema, mass, the wound is clean and dry  No evidence of infection  Psychiatric: He has a normal mood and affect  Vitals reviewed  Neurologic Exam     Mental Status   Oriented to person, place, and time  Cranial Nerves     CN III, IV, VI   Pupils are equal, round, and reactive to light    Extraocular motions are normal

## 2020-01-21 ENCOUNTER — OFFICE VISIT (OUTPATIENT)
Dept: NEUROSURGERY | Facility: CLINIC | Age: 40
End: 2020-01-21

## 2020-01-21 VITALS
DIASTOLIC BLOOD PRESSURE: 88 MMHG | HEIGHT: 70 IN | BODY MASS INDEX: 27.35 KG/M2 | RESPIRATION RATE: 16 BRPM | SYSTOLIC BLOOD PRESSURE: 142 MMHG | HEART RATE: 84 BPM | WEIGHT: 191 LBS | TEMPERATURE: 97.8 F

## 2020-01-21 DIAGNOSIS — M54.16 LUMBAR RADICULOPATHY: ICD-10-CM

## 2020-01-21 DIAGNOSIS — Z98.890 STATUS POST LUMBAR DISCECTOMY: Primary | ICD-10-CM

## 2020-01-21 PROCEDURE — 99024 POSTOP FOLLOW-UP VISIT: CPT | Performed by: PHYSICIAN ASSISTANT

## 2020-01-21 NOTE — PATIENT INSTRUCTIONS
· At this time, we do not anticipate any further neurosurgical intervention  · We recommend that you continue conservative management with pain management and consideration for physical therapy  · Continue follow up with your primary care provider for general health and we recommend healthy choices  · No follow up is required by us, you can follow up with us as needed  · Please contact us with any questions or concerns

## 2020-01-21 NOTE — PROGRESS NOTES
Neurosurgery Office Note  Abdullahi Adams 44 y o  male MRN: [id number]      Assessment/Plan        Diagnoses and all orders for this visit:    Status post lumbar discectomy    Lumbar radiculopathy        Patient presents to the neurosurgery office approximately 6 weeks status post reopening of lumbar incision for left L5-S1 minimally invasive medial facetectomy and microdiskectomy and epidural steroid injection on 12/11/2019      Exam: GCS 15, AAO X 3, FARIA, strength BUE 5/5, BLE 4+/5, sensation intact to LT X 4, Reflexes 2+ and symmetric, no richmond's or clonus, no drift bilaterally  o Posterior lumbar incision is healing well  No swelling or erythema noted  Plan    · Patient is progressing well post op and reports improvement in his back and left leg pain  · Patient to continue to increase their activity level as tolerated, work on core strengthening and range of motion exercises  · At this time, no further neurosurgical intervention is anticipated  Pt to follow up with neurosurgery on as needed basis  · Discussed recommendations with patient who showed understanding  · Patient was made aware to contact neurosurgery office with any questions or concerns  CHIEF COMPLAINT    Chief Complaint   Patient presents with    Post-op       HISTORY    History of Present Illness     44y o  year old male     HPI      Patient presents to the neurosurgery office approximately 6 weeks status post reopening of lumbar incision for left L5-S1 minimally invasive medial facetectomy and microdiskectomy and epidural steroid injection on 12/11/2019  for recurrent left lumbar radiculopathy secondary to herniated disc at L5-S1  Preop pt had reported low back pain that radiated to the left leg and to his calf  Pt had denied associated numbness or tingling  Prior to surgery, pt had tried chiropractic manipulation and NSAID which did not help  Pt had been uncomfortable with trying physical therapy    Patient reports than since surgery he has noticed improvement in his back and left leg radicular pain  He reports that before and after surgery seems like night and day  Pt reports that once in a while when he moves a certain when he will get pain/ discomfort on the back of the left leg  He rates his pain now as 1/10 on the pain scale  He reports that now when he gets intermittent radiating pain it's only on parts of his left leg not the whole left leg  Pt reports he is noticing a little numbness in the right toe/right lateral side of right leg  At this time, patient denies any new weakness in his extremities  Patient denies any bowel or bladder incontinence  Pt denies fever, chills, chest pain, shortness of breath, nausea, vomiting or abdominal pain  Pt had been to see Dr Larissa Benavidez of pain management  He was prescribed Gabapentin 300mg TID that he reports he has stopped taking about 2 weeks ago  Pt owns a [employer]  He reports he has already returned to work  He reports he has been careful to avoid lifting heavy objects  Pt reports he plans to get back to swimming and working on core strengthening exercises  REVIEW OF SYSTEMS    Review of Systems   Constitutional: Negative  HENT: Negative  Eyes: Negative  Respiratory: Negative  Cardiovascular: Negative  Gastrointestinal: Negative  Endocrine: Negative  Genitourinary: Negative  Musculoskeletal: Negative  Negative for back pain (Left calf and thigh discomfort  Pain currently 1/10)  Neurological: Positive for numbness (Right toes)  Negative for weakness  Hematological: Does not bruise/bleed easily  Psychiatric/Behavioral: Negative for sleep disturbance (some discomfort when laying down )           Meds/Allergies     Current Outpatient Medications   Medication Sig Dispense Refill    multivitamin (THERAGRAN) TABS Take 1 tablet by mouth daily      diclofenac sodium (VOLTAREN) 1 % Apply 2 g topically 4 (four) times a day (Patient not taking: Reported on 12/23/2019) 1 Tube 1    gabapentin (NEURONTIN) 300 mg capsule Take 1 capsule (300 mg total) by mouth 3 (three) times a day 90 capsule 1    ibuprofen (MOTRIN) 200 mg tablet Take 600 mg by mouth as needed for mild pain       No current facility-administered medications for this visit  No Known Allergies    PAST HISTORY    Past Medical History:   Diagnosis Date    Low back pain     Lumbosacral disc disease        Past Surgical History:   Procedure Laterality Date    LUMBAR SPINE SURGERY  10/07/2015    Dr Karmen Milligan LAMINEC/FACETECT/FORAMIN,LUMBAR 1 SEG Left 3/12/2019    Procedure: L5-S1 MINIMALLY INVASIVE LAMINAL FORAMINOTOMY AND POSSIBLE MICRODISCECTOMY WITH POSSIBLE EPIDURAL STEROID INJECTION;  Surgeon: Nicole Santacruz MD;  [facility];  Service: Neurosurgery    OK REDO EXCIS LUMBAR DISK Left 12/11/2019    Procedure: reopening of lumbar incision for minimally invasive left L5-S1 microdiskectomy and epidural steroid injection;  Surgeon: Nicole Santacruz MD;  [facility];  Service: Neurosurgery       Social History     Tobacco Use    Smoking status: Never Smoker    Smokeless tobacco: Never Used   Substance Use Topics    Alcohol use: Yes     Alcohol/week: 4 0 standard drinks     Types: 4 Cans of beer per week     Frequency: 2-3 times a week     Drinks per session: 3 or 4     Binge frequency: Never    Drug use: No       Family History   Problem Relation Age of Onset    Lumbar disc disease Father     Asthma Father     Cancer Mother          Above history personally reviewed  EXAM    Vitals:Blood pressure 142/88, pulse 84, temperature 97 8 °F (36 6 °C), temperature source Tympanic, resp  rate 16, height 5' 10" (1 778 m), weight 86 6 kg (191 lb)  ,Body mass index is 27 41 kg/m²  Physical Exam   Constitutional: He is oriented to person, place, and time  He appears well-developed and well-nourished  HENT:   Head: Normocephalic and atraumatic     Eyes: Pupils are equal, round, and reactive to light  Conjunctivae and EOM are normal  No scleral icterus  Neck: Normal range of motion  Neck supple  No tracheal deviation present  Cardiovascular: Normal rate  Pulmonary/Chest: Effort normal    Abdominal: Soft  There is no tenderness  There is no guarding  Musculoskeletal: He exhibits no edema  Neurological: He is alert and oriented to person, place, and time  GCS 15, AAO X 3, FARIA, strength BUE 5/5, BLE 4+/5, sensation intact to LT X 4, Reflexes 2+ and symmetric, no richmond's or clonus, no drift bilaterally  Skin: Skin is warm and dry  No rash noted  No pallor  Posterior lumbar incision is healing well  No swelling or erythema noted  Psychiatric: He has a normal mood and affect  His behavior is normal        Neurologic Exam     Mental Status   Oriented to person, place, and time  Cranial Nerves     CN III, IV, VI   Pupils are equal, round, and reactive to light  Extraocular motions are normal          MEDICAL DECISION MAKING    Imaging Studies:     I have personally reviewed pertinent reports     and I have personally reviewed pertinent films in PACS

## 2020-11-19 ENCOUNTER — OFFICE VISIT (OUTPATIENT)
Dept: INTERNAL MEDICINE CLINIC | Facility: CLINIC | Age: 40
End: 2020-11-19
Payer: COMMERCIAL

## 2020-11-19 VITALS
RESPIRATION RATE: 18 BRPM | WEIGHT: 197.8 LBS | DIASTOLIC BLOOD PRESSURE: 86 MMHG | BODY MASS INDEX: 28.32 KG/M2 | SYSTOLIC BLOOD PRESSURE: 124 MMHG | HEART RATE: 72 BPM | HEIGHT: 70 IN | TEMPERATURE: 98.2 F

## 2020-11-19 DIAGNOSIS — Z11.59 NEED FOR HEPATITIS C SCREENING TEST: ICD-10-CM

## 2020-11-19 DIAGNOSIS — Z23 ENCOUNTER FOR IMMUNIZATION: ICD-10-CM

## 2020-11-19 DIAGNOSIS — Z00.00 ANNUAL PHYSICAL EXAM: Primary | ICD-10-CM

## 2020-11-19 DIAGNOSIS — Z23 NEED FOR TDAP VACCINATION: ICD-10-CM

## 2020-11-19 DIAGNOSIS — Z11.4 SCREENING FOR HIV (HUMAN IMMUNODEFICIENCY VIRUS): ICD-10-CM

## 2020-11-19 PROBLEM — M47.816 LUMBAR SPONDYLOSIS: Status: RESOLVED | Noted: 2019-02-15 | Resolved: 2020-11-19

## 2020-11-19 PROBLEM — M51.26 LUMBAR DISC HERNIATION: Status: RESOLVED | Noted: 2019-11-19 | Resolved: 2020-11-19

## 2020-11-19 PROBLEM — M51.26 RECURRENT DISPLACEMENT OF LUMBAR DISC: Status: RESOLVED | Noted: 2019-11-15 | Resolved: 2020-11-19

## 2020-11-19 PROBLEM — Z98.890 STATUS POST LUMBAR DISCECTOMY: Status: RESOLVED | Noted: 2018-11-06 | Resolved: 2020-11-19

## 2020-11-19 PROBLEM — M54.16 LUMBAR RADICULOPATHY: Status: RESOLVED | Noted: 2018-12-04 | Resolved: 2020-11-19

## 2020-11-19 PROBLEM — M54.50 LOW BACK PAIN: Status: RESOLVED | Noted: 2019-11-20 | Resolved: 2020-11-19

## 2020-11-19 PROCEDURE — 90715 TDAP VACCINE 7 YRS/> IM: CPT | Performed by: NURSE PRACTITIONER

## 2020-11-19 PROCEDURE — 3725F SCREEN DEPRESSION PERFORMED: CPT | Performed by: NURSE PRACTITIONER

## 2020-11-19 PROCEDURE — 99396 PREV VISIT EST AGE 40-64: CPT | Performed by: NURSE PRACTITIONER

## 2020-11-19 PROCEDURE — 90471 IMMUNIZATION ADMIN: CPT | Performed by: NURSE PRACTITIONER

## 2020-11-19 PROCEDURE — 3008F BODY MASS INDEX DOCD: CPT | Performed by: NURSE PRACTITIONER

## 2020-11-19 PROCEDURE — 1036F TOBACCO NON-USER: CPT | Performed by: NURSE PRACTITIONER

## 2020-12-02 ENCOUNTER — TELEPHONE (OUTPATIENT)
Dept: INTERNAL MEDICINE CLINIC | Facility: CLINIC | Age: 40
End: 2020-12-02

## 2020-12-22 ENCOUNTER — LAB (OUTPATIENT)
Dept: LAB | Facility: CLINIC | Age: 40
End: 2020-12-22
Payer: COMMERCIAL

## 2020-12-22 DIAGNOSIS — Z11.59 NEED FOR HEPATITIS C SCREENING TEST: ICD-10-CM

## 2020-12-22 DIAGNOSIS — Z00.00 ANNUAL PHYSICAL EXAM: ICD-10-CM

## 2020-12-22 DIAGNOSIS — Z11.4 SCREENING FOR HIV (HUMAN IMMUNODEFICIENCY VIRUS): ICD-10-CM

## 2020-12-22 LAB
ANION GAP SERPL CALCULATED.3IONS-SCNC: 5 MMOL/L (ref 4–13)
BASOPHILS # BLD AUTO: 0.03 THOUSANDS/ΜL (ref 0–0.1)
BASOPHILS NFR BLD AUTO: 1 % (ref 0–1)
BILIRUB UR QL STRIP: NEGATIVE
BUN SERPL-MCNC: 13 MG/DL (ref 5–25)
CALCIUM SERPL-MCNC: 9.7 MG/DL (ref 8.3–10.1)
CHLORIDE SERPL-SCNC: 109 MMOL/L (ref 100–108)
CHOLEST SERPL-MCNC: 200 MG/DL (ref 50–200)
CLARITY UR: CLEAR
CO2 SERPL-SCNC: 28 MMOL/L (ref 21–32)
COLOR UR: YELLOW
CREAT SERPL-MCNC: 0.88 MG/DL (ref 0.6–1.3)
EOSINOPHIL # BLD AUTO: 0.09 THOUSAND/ΜL (ref 0–0.61)
EOSINOPHIL NFR BLD AUTO: 2 % (ref 0–6)
ERYTHROCYTE [DISTWIDTH] IN BLOOD BY AUTOMATED COUNT: 12.2 % (ref 11.6–15.1)
GFR SERPL CREATININE-BSD FRML MDRD: 107 ML/MIN/1.73SQ M
GLUCOSE P FAST SERPL-MCNC: 104 MG/DL (ref 65–99)
GLUCOSE UR STRIP-MCNC: NEGATIVE MG/DL
HCT VFR BLD AUTO: 50.1 % (ref 36.5–49.3)
HCV AB SER QL: NORMAL
HDLC SERPL-MCNC: 63 MG/DL
HGB BLD-MCNC: 16.2 G/DL (ref 12–17)
HGB UR QL STRIP.AUTO: NEGATIVE
IMM GRANULOCYTES # BLD AUTO: 0.02 THOUSAND/UL (ref 0–0.2)
IMM GRANULOCYTES NFR BLD AUTO: 0 % (ref 0–2)
KETONES UR STRIP-MCNC: NEGATIVE MG/DL
LDLC SERPL CALC-MCNC: 111 MG/DL (ref 0–100)
LEUKOCYTE ESTERASE UR QL STRIP: NEGATIVE
LYMPHOCYTES # BLD AUTO: 2.48 THOUSANDS/ΜL (ref 0.6–4.47)
LYMPHOCYTES NFR BLD AUTO: 49 % (ref 14–44)
MCH RBC QN AUTO: 30.5 PG (ref 26.8–34.3)
MCHC RBC AUTO-ENTMCNC: 32.3 G/DL (ref 31.4–37.4)
MCV RBC AUTO: 94 FL (ref 82–98)
MONOCYTES # BLD AUTO: 0.42 THOUSAND/ΜL (ref 0.17–1.22)
MONOCYTES NFR BLD AUTO: 8 % (ref 4–12)
NEUTROPHILS # BLD AUTO: 1.99 THOUSANDS/ΜL (ref 1.85–7.62)
NEUTS SEG NFR BLD AUTO: 40 % (ref 43–75)
NITRITE UR QL STRIP: NEGATIVE
NONHDLC SERPL-MCNC: 137 MG/DL
NRBC BLD AUTO-RTO: 0 /100 WBCS
PH UR STRIP.AUTO: 7.5 [PH]
PLATELET # BLD AUTO: 224 THOUSANDS/UL (ref 149–390)
PMV BLD AUTO: 11.7 FL (ref 8.9–12.7)
POTASSIUM SERPL-SCNC: 4.6 MMOL/L (ref 3.5–5.3)
PROT UR STRIP-MCNC: NEGATIVE MG/DL
RBC # BLD AUTO: 5.31 MILLION/UL (ref 3.88–5.62)
SODIUM SERPL-SCNC: 142 MMOL/L (ref 136–145)
SP GR UR STRIP.AUTO: 1.02 (ref 1–1.03)
TRIGL SERPL-MCNC: 130 MG/DL
UROBILINOGEN UR QL STRIP.AUTO: 0.2 E.U./DL
WBC # BLD AUTO: 5.03 THOUSAND/UL (ref 4.31–10.16)

## 2020-12-22 PROCEDURE — 85025 COMPLETE CBC W/AUTO DIFF WBC: CPT

## 2020-12-22 PROCEDURE — 81003 URINALYSIS AUTO W/O SCOPE: CPT | Performed by: NURSE PRACTITIONER

## 2020-12-22 PROCEDURE — 80061 LIPID PANEL: CPT

## 2020-12-22 PROCEDURE — 80048 BASIC METABOLIC PNL TOTAL CA: CPT

## 2020-12-22 PROCEDURE — 86803 HEPATITIS C AB TEST: CPT

## 2020-12-22 PROCEDURE — 36415 COLL VENOUS BLD VENIPUNCTURE: CPT

## 2020-12-22 PROCEDURE — 87389 HIV-1 AG W/HIV-1&-2 AB AG IA: CPT

## 2020-12-23 LAB — HIV 1+2 AB+HIV1 P24 AG SERPL QL IA: NORMAL

## 2021-01-13 ENCOUNTER — TELEPHONE (OUTPATIENT)
Dept: INTERNAL MEDICINE CLINIC | Facility: CLINIC | Age: 41
End: 2021-01-13

## 2021-01-13 NOTE — TELEPHONE ENCOUNTER
----- Message from Dylon Jeffery sent at 1/13/2021  4:06 PM EST -----  Regarding: FW: Visit Follow-Up Question  Contact: [phone]  Can you look into this? I know this form was completed-it's not scanned into media  Maybe they are behind in scanning????    Brittanie Becerril  ----- Message -----  From: Gabby Rice  Sent: 1/13/2021   3:56 PM EST  To: NOEL Jeffery  Subject: FW: Visit Follow-Up Question                       ----- Message -----  From: Gwen Iqbal  Sent: 1/13/2021   2:59 PM EST  To: [facility]  Subject: Visit Follow-Up Question                         Manish Monroe! When I had it visit I as brought in a form that needed to be filled in for my HSA  We could not complete it because I had not yet done my labs  I believe someone from your office was going to send it in after lab results received  I just contacted our [facility] provider and they have still not received the form  It is very easy for me to send it directly if I can get the form back  Thanks so much!     Ana Means

## 2021-01-13 NOTE — TELEPHONE ENCOUNTER
Called patient left detailed message letting him know form is completed and if he needs it fax to provide us with that placed original copy upfront for pickup

## 2021-01-14 NOTE — TELEPHONE ENCOUNTER
Called patient to make sure patient received the message I  Left yesterday, he will pick form up today

## 2021-01-24 DIAGNOSIS — Z23 ENCOUNTER FOR IMMUNIZATION: ICD-10-CM

## 2021-01-29 ENCOUNTER — IMMUNIZATIONS (OUTPATIENT)
Dept: FAMILY MEDICINE CLINIC | Facility: HOSPITAL | Age: 41
End: 2021-01-29

## 2021-01-29 DIAGNOSIS — Z23 ENCOUNTER FOR IMMUNIZATION: Primary | ICD-10-CM

## 2021-01-29 PROCEDURE — 0011A SARS-COV-2 / COVID-19 MRNA VACCINE (MODERNA) 100 MCG: CPT

## 2021-01-29 PROCEDURE — 91301 SARS-COV-2 / COVID-19 MRNA VACCINE (MODERNA) 100 MCG: CPT

## 2021-03-03 ENCOUNTER — IMMUNIZATIONS (OUTPATIENT)
Dept: FAMILY MEDICINE CLINIC | Facility: HOSPITAL | Age: 41
End: 2021-03-03

## 2021-03-03 DIAGNOSIS — Z23 ENCOUNTER FOR IMMUNIZATION: Primary | ICD-10-CM

## 2021-03-03 PROCEDURE — 0012A SARS-COV-2 / COVID-19 MRNA VACCINE (MODERNA) 100 MCG: CPT

## 2021-03-03 PROCEDURE — 91301 SARS-COV-2 / COVID-19 MRNA VACCINE (MODERNA) 100 MCG: CPT

## 2021-11-22 ENCOUNTER — OFFICE VISIT (OUTPATIENT)
Dept: INTERNAL MEDICINE CLINIC | Facility: CLINIC | Age: 41
End: 2021-11-22
Payer: COMMERCIAL

## 2021-11-22 VITALS
HEIGHT: 70 IN | WEIGHT: 194 LBS | SYSTOLIC BLOOD PRESSURE: 128 MMHG | BODY MASS INDEX: 27.77 KG/M2 | DIASTOLIC BLOOD PRESSURE: 82 MMHG | RESPIRATION RATE: 18 BRPM | TEMPERATURE: 97.8 F | HEART RATE: 80 BPM | OXYGEN SATURATION: 97 %

## 2021-11-22 DIAGNOSIS — Z00.00 ANNUAL PHYSICAL EXAM: ICD-10-CM

## 2021-11-22 DIAGNOSIS — Z23 ENCOUNTER FOR IMMUNIZATION: Primary | ICD-10-CM

## 2021-11-22 PROCEDURE — 90471 IMMUNIZATION ADMIN: CPT | Performed by: NURSE PRACTITIONER

## 2021-11-22 PROCEDURE — 3008F BODY MASS INDEX DOCD: CPT | Performed by: NURSE PRACTITIONER

## 2021-11-22 PROCEDURE — 90686 IIV4 VACC NO PRSV 0.5 ML IM: CPT | Performed by: NURSE PRACTITIONER

## 2021-11-22 PROCEDURE — 3725F SCREEN DEPRESSION PERFORMED: CPT | Performed by: NURSE PRACTITIONER

## 2021-11-22 PROCEDURE — 99396 PREV VISIT EST AGE 40-64: CPT | Performed by: NURSE PRACTITIONER

## 2021-11-22 PROCEDURE — 1036F TOBACCO NON-USER: CPT | Performed by: NURSE PRACTITIONER

## 2022-01-24 ENCOUNTER — TELEPHONE (OUTPATIENT)
Dept: INTERNAL MEDICINE CLINIC | Facility: CLINIC | Age: 42
End: 2022-01-24

## 2022-01-24 ENCOUNTER — APPOINTMENT (OUTPATIENT)
Dept: LAB | Facility: CLINIC | Age: 42
End: 2022-01-24
Payer: COMMERCIAL

## 2022-01-24 DIAGNOSIS — Z00.00 ANNUAL PHYSICAL EXAM: ICD-10-CM

## 2022-01-24 LAB
ANION GAP SERPL CALCULATED.3IONS-SCNC: 3 MMOL/L (ref 4–13)
BASOPHILS # BLD AUTO: 0.02 THOUSANDS/ΜL (ref 0–0.1)
BASOPHILS NFR BLD AUTO: 0 % (ref 0–1)
BUN SERPL-MCNC: 15 MG/DL (ref 5–25)
CALCIUM SERPL-MCNC: 9.3 MG/DL (ref 8.3–10.1)
CHLORIDE SERPL-SCNC: 108 MMOL/L (ref 100–108)
CHOLEST SERPL-MCNC: 202 MG/DL
CO2 SERPL-SCNC: 28 MMOL/L (ref 21–32)
CREAT SERPL-MCNC: 0.85 MG/DL (ref 0.6–1.3)
EOSINOPHIL # BLD AUTO: 0.03 THOUSAND/ΜL (ref 0–0.61)
EOSINOPHIL NFR BLD AUTO: 1 % (ref 0–6)
ERYTHROCYTE [DISTWIDTH] IN BLOOD BY AUTOMATED COUNT: 12.3 % (ref 11.6–15.1)
GFR SERPL CREATININE-BSD FRML MDRD: 108 ML/MIN/1.73SQ M
GLUCOSE P FAST SERPL-MCNC: 101 MG/DL (ref 65–99)
HCT VFR BLD AUTO: 47.5 % (ref 36.5–49.3)
HDLC SERPL-MCNC: 61 MG/DL
HGB BLD-MCNC: 15.8 G/DL (ref 12–17)
IMM GRANULOCYTES # BLD AUTO: 0.02 THOUSAND/UL (ref 0–0.2)
IMM GRANULOCYTES NFR BLD AUTO: 0 % (ref 0–2)
LDLC SERPL CALC-MCNC: 121 MG/DL (ref 0–100)
LYMPHOCYTES # BLD AUTO: 2.67 THOUSANDS/ΜL (ref 0.6–4.47)
LYMPHOCYTES NFR BLD AUTO: 47 % (ref 14–44)
MCH RBC QN AUTO: 30.7 PG (ref 26.8–34.3)
MCHC RBC AUTO-ENTMCNC: 33.3 G/DL (ref 31.4–37.4)
MCV RBC AUTO: 92 FL (ref 82–98)
MONOCYTES # BLD AUTO: 0.38 THOUSAND/ΜL (ref 0.17–1.22)
MONOCYTES NFR BLD AUTO: 7 % (ref 4–12)
NEUTROPHILS # BLD AUTO: 2.59 THOUSANDS/ΜL (ref 1.85–7.62)
NEUTS SEG NFR BLD AUTO: 45 % (ref 43–75)
NRBC BLD AUTO-RTO: 0 /100 WBCS
PLATELET # BLD AUTO: 214 THOUSANDS/UL (ref 149–390)
PMV BLD AUTO: 11.6 FL (ref 8.9–12.7)
POTASSIUM SERPL-SCNC: 4.2 MMOL/L (ref 3.5–5.3)
RBC # BLD AUTO: 5.14 MILLION/UL (ref 3.88–5.62)
SODIUM SERPL-SCNC: 139 MMOL/L (ref 136–145)
TRIGL SERPL-MCNC: 102 MG/DL
WBC # BLD AUTO: 5.71 THOUSAND/UL (ref 4.31–10.16)

## 2022-01-24 PROCEDURE — 80048 BASIC METABOLIC PNL TOTAL CA: CPT

## 2022-01-24 PROCEDURE — 36415 COLL VENOUS BLD VENIPUNCTURE: CPT

## 2022-01-24 PROCEDURE — 80061 LIPID PANEL: CPT

## 2022-01-24 PROCEDURE — 85025 COMPLETE CBC W/AUTO DIFF WBC: CPT

## 2022-11-29 ENCOUNTER — OFFICE VISIT (OUTPATIENT)
Dept: INTERNAL MEDICINE CLINIC | Facility: CLINIC | Age: 42
End: 2022-11-29

## 2022-11-29 VITALS
BODY MASS INDEX: 28.63 KG/M2 | HEIGHT: 70 IN | SYSTOLIC BLOOD PRESSURE: 122 MMHG | RESPIRATION RATE: 20 BRPM | HEART RATE: 78 BPM | WEIGHT: 200 LBS | OXYGEN SATURATION: 96 % | DIASTOLIC BLOOD PRESSURE: 70 MMHG | TEMPERATURE: 98 F

## 2022-11-29 DIAGNOSIS — Z13.6 ENCOUNTER FOR LIPID SCREENING FOR CARDIOVASCULAR DISEASE: ICD-10-CM

## 2022-11-29 DIAGNOSIS — Z00.00 ANNUAL PHYSICAL EXAM: ICD-10-CM

## 2022-11-29 DIAGNOSIS — R73.01 ELEVATED FASTING GLUCOSE: ICD-10-CM

## 2022-11-29 DIAGNOSIS — Z23 ENCOUNTER FOR IMMUNIZATION: Primary | ICD-10-CM

## 2022-11-29 DIAGNOSIS — Z13.220 ENCOUNTER FOR LIPID SCREENING FOR CARDIOVASCULAR DISEASE: ICD-10-CM

## 2022-11-29 DIAGNOSIS — E55.9 VITAMIN D DEFICIENCY: ICD-10-CM

## 2022-11-29 NOTE — ASSESSMENT & PLAN NOTE
His BMI is 28 7  He reports he has got help on been using the palatine bike several times a week  He is also running 5Ks  Patient states he is also watching what he is eating

## 2022-11-29 NOTE — PROGRESS NOTES
ADULT ANNUAL [address]     NAME: Azael Box  AGE: 43 y o  SEX: male  : 1980     DATE: 2022     Assessment and Plan:     Problem List Items Addressed This Visit    None  Visit Diagnoses     Encounter for immunization    -  Primary    Relevant Orders    influenza vaccine, quadrivalent, 0 5 mL, preservative-free, for adult and pediatric patients 6 mos+ (AFLURIA, FLUARIX, FLULAVAL, FLUZONE) (Completed)    Annual physical exam        Follow-up 1 year    Relevant Orders    CBC and differential    Comprehensive metabolic panel    Vitamin D deficiency        Relevant Orders    Vitamin D 25 hydroxy    Elevated fasting glucose        Relevant Orders    Comprehensive metabolic panel    Hemoglobin A1C    Encounter for lipid screening for cardiovascular disease        Relevant Orders    Lipid panel          Immunizations and preventive care screenings were discussed with patient today  Appropriate education was printed on patient's after visit summary  Counseling:  Alcohol/drug use: discussed moderation in alcohol intake, the recommendations for healthy alcohol use, and avoidance of illicit drug use  Dental Health: discussed importance of regular tooth brushing, flossing, and dental visits  Injury prevention: discussed safety/seat belts, safety helmets, smoke detectors, carbon monoxide detectors, and smoking near bedding or upholstery  Sexual health: discussed sexually transmitted diseases, partner selection, use of condoms, avoidance of unintended pregnancy, and contraceptive alternatives  · Exercise: the importance of regular exercise/physical activity was discussed  Recommend exercise 3-5 times per week for at least 30 minutes            Return in about 1 year (around 2023), or if symptoms worsen or fail to improve, for Annual physical      Chief Complaint:     Chief Complaint   Patient presents with   • Annual Exam History of Present Illness:     Adult Annual Physical   Patient here for a comprehensive physical exam  The patient reports no problems  Diet and Physical Activity  · Diet/Nutrition: well balanced diet  · Exercise: 5-7 times a week on average  Depression Screening  PHQ-2/9 Depression Screening    Little interest or pleasure in doing things: 0 - not at all  Feeling down, depressed, or hopeless: 0 - not at all  PHQ-2 Score: 0  PHQ-2 Interpretation: Negative depression screen       General Health  · Sleep: sleeps well and gets more than 8 hours of sleep on average  · Hearing: normal - bilateral   · Vision: goes for regular eye exams, most recent eye exam <1 year ago and wears contacts  · Dental: regular dental visits and brushes teeth twice daily   Health  · Symptoms include: none     Review of Systems:     Review of Systems   Constitutional: Negative  HENT: Negative  Eyes: Negative  Respiratory: Negative  Cardiovascular: Negative  Gastrointestinal: Negative  Genitourinary: Negative  Musculoskeletal: Positive for back pain (lumbar)  Skin: Negative  Neurological: Negative  Psychiatric/Behavioral: Negative for sleep disturbance and suicidal ideas  The patient is nervous/anxious         Past Medical History:     Past Medical History:   Diagnosis Date   • Allergic 01/01/1999    Cats   • Low back pain    • Lumbar disc herniation 11/19/2019    Added automatically from request for surgery [id number]   • Lumbar radiculopathy 12/04/2018   • Lumbar spondylosis 02/15/2019   • Lumbosacral disc disease    • Recurrent displacement of lumbar disc 11/15/2019   • Shingles 02/29/2004   • Status post lumbar discectomy 11/06/2018      Past Surgical History:     Past Surgical History:   Procedure Laterality Date   • LUMBAR SPINE SURGERY  10/07/2015    Dr Pollard Dry   • AR LAMINEC/FACETECT/FORAMIN,LUMBAR 1 SEG Left 03/12/2019    Procedure: L5-S1 MINIMALLY INVASIVE LAMINAL FORAMINOTOMY AND POSSIBLE MICRODISCECTOMY WITH POSSIBLE EPIDURAL STEROID INJECTION;  Surgeon: Elda Boyd MD;  Location: [facility];  Service: Neurosurgery   • TN REDO EXCIS LUMBAR DISK Left 12/11/2019    Procedure: reopening of lumbar incision for minimally invasive left L5-S1 microdiskectomy and epidural steroid injection;  Surgeon: Elda Boyd MD;  Location: [facility];  Service: Neurosurgery   • SPINE SURGERY  3 disc ectomies      Family History:     Family History   Problem Relation Age of Onset   • Lumbar disc disease Father    • Asthma Father    • Cancer Mother       Social History:     Social History     Socioeconomic History   • Marital status: /Civil Union     Spouse name: None   • Number of children: None   • Years of education: None   • Highest education level: None   Occupational History   • None   Tobacco Use   • Smoking status: Never   • Smokeless tobacco: Never   Vaping Use   • Vaping Use: Never used   Substance and Sexual Activity   • Alcohol use: Yes     Alcohol/week: 7 0 standard drinks     Types: 3 Glasses of wine, 4 Cans of beer per week   • Drug use: No   • Sexual activity: Yes     Partners: Female     Birth control/protection: Male Sterilization   Other Topics Concern   • None   Social History Narrative   • None     Social Determinants of Health     Financial Resource Strain: Not on file   Food Insecurity: Not on file   Transportation Needs: Not on file   Physical Activity: Not on file   Stress: Not on file   Social Connections: Not on file   Intimate Partner Violence: Not on file   Housing Stability: Not on file      Current Medications:     Current Outpatient Medications   Medication Sig Dispense Refill   • multivitamin (THERAGRAN) TABS Take 1 tablet by mouth daily       No current facility-administered medications for this visit        Allergies:     No Known Allergies   Physical Exam:     /70 (BP Location: Left arm, Patient Position: Sitting, Cuff Size: Large)   Pulse 78 Temp 98 °F (36 7 °C) (Tympanic)   Resp 20   Ht 5' 10" (1 778 m)   Wt 90 7 kg (200 lb)   SpO2 96%   BMI 28 70 kg/m²     Physical Exam  Vitals and nursing note reviewed  Constitutional:       General: He is not in acute distress  Appearance: He is well-developed  HENT:      Head: Normocephalic and atraumatic  Eyes:      Conjunctiva/sclera: Conjunctivae normal    Cardiovascular:      Rate and Rhythm: Normal rate and regular rhythm  Heart sounds: No murmur heard  Pulmonary:      Effort: Pulmonary effort is normal  No respiratory distress  Breath sounds: Normal breath sounds  Abdominal:      Palpations: Abdomen is soft  Tenderness: There is no abdominal tenderness  Musculoskeletal:         General: No swelling  Cervical back: Neck supple  Skin:     General: Skin is warm and dry  Capillary Refill: Capillary refill takes less than 2 seconds  Neurological:      Mental Status: He is alert     Psychiatric:         Mood and Affect: Mood normal           Kathy Galvez, [address] [facility]

## 2022-11-29 NOTE — PATIENT INSTRUCTIONS
Wellness Visit for Adults   AMBULATORY CARE:   A wellness visit  is when you see your healthcare provider to get screened for health problems  Your healthcare provider will also give you advice on how to stay healthy  Write down your questions so you remember to ask them  Ask your healthcare provider how often you should have a wellness visit  What happens at a wellness visit:  Your healthcare provider will ask about your health, and your family history of health problems  This includes high blood pressure, heart disease, and cancer  He or she will ask if you have symptoms that concern you, if you smoke, and about your mood  You may also be asked about your intake of medicines, supplements, food, and alcohol  Any of the following may be done: Your weight  will be checked  Your height may also be checked so your body mass index (BMI) can be calculated  Your BMI shows if you are at a healthy weight  Your blood pressure  and heart rate will be checked  Your temperature may also be checked  Blood and urine tests  may be done  Blood tests may be done to check your cholesterol levels  Abnormal cholesterol levels increase your risk for heart disease and stroke  You may also need a blood or urine test to check for diabetes if you are at increased risk  Urine tests may be done to look for signs of an infection or kidney disease  A physical exam  includes checking your heartbeat and lungs with a stethoscope  Your healthcare provider may also check your skin to look for sun damage  Screening tests  may be recommended  A screening test is done to check for diseases that may not cause symptoms  The screening tests you may need depend on your age, gender, family history, and lifestyle habits  For example, colorectal screening may be recommended if you are 48years old or older  Screening tests you need if you are a woman:   A Pap smear  is used to screen for cervical cancer   Pap smears are usually done every 3 to 5 years depending on your age  You may need them more often if you have had abnormal Pap smear test results in the past  Ask your healthcare provider how often you should have a Pap smear  A mammogram  is an x-ray of your breasts to screen for breast cancer  Experts recommend mammograms every 2 years starting at age 48 years  You may need a mammogram at age 52 years or younger if you have an increased risk for breast cancer  Talk to your healthcare provider about when you should start having mammograms and how often you need them  Vaccines you may need:   Get an influenza vaccine  every year  The influenza vaccine protects you from the flu  Several types of viruses cause the flu  The viruses change over time, so new vaccines are made each year  Get a tetanus-diphtheria (Td) booster vaccine  every 10 years  This vaccine protects you against tetanus and diphtheria  Tetanus is a severe infection that may cause painful muscle spasms and lockjaw  Diphtheria is a severe bacterial infection that causes a thick covering in the back of your mouth and throat  Get a human papillomavirus (HPV) vaccine  if you are female and aged 23 to 32 or male 23 to 24 and never received it  This vaccine protects you from HPV infection  HPV is the most common infection spread by sexual contact  HPV may also cause vaginal, penile, and anal cancers  Get a pneumococcal vaccine  if you are aged 72 years or older  The pneumococcal vaccine is an injection given to protect you from pneumococcal disease  Pneumococcal disease is an infection caused by pneumococcal bacteria  The infection may cause pneumonia, meningitis, or an ear infection  Get a shingles vaccine  if you are 60 or older, even if you have had shingles before  The shingles vaccine is an injection to protect you from the varicella-zoster virus  This is the same virus that causes chickenpox   Shingles is a painful rash that develops in people who had chickenpox or have been exposed to the virus  How to eat healthy:  My Plate is a model for planning healthy meals  It shows the types and amounts of foods that should go on your plate  Fruits and vegetables make up about half of your plate, and grains and protein make up the other half  A serving of dairy is included on the side of your plate  The amount of calories and serving sizes you need depends on your age, gender, weight, and height  Examples of healthy foods are listed below:  Eat a variety of vegetables  such as dark green, red, and orange vegetables  You can also include canned vegetables low in sodium (salt) and frozen vegetables without added butter or sauces  Eat a variety of fresh fruits , canned fruit in 100% juice, frozen fruit, and dried fruit  Include whole grains  At least half of the grains you eat should be whole grains  Examples include whole-wheat bread, wheat pasta, brown rice, and whole-grain cereals such as oatmeal     Eat a variety of protein foods such as seafood (fish and shellfish), lean meat, and poultry without skin (turkey and chicken)  Examples of lean meats include pork leg, shoulder, or tenderloin, and beef round, sirloin, tenderloin, and extra lean ground beef  Other protein foods include eggs and egg substitutes, beans, peas, soy products, nuts, and seeds  Choose low-fat dairy products such as skim or 1% milk or low-fat yogurt, cheese, and cottage cheese  Limit unhealthy fats  such as butter, hard margarine, and shortening  Exercise:  Exercise at least 30 minutes per day on most days of the week  Some examples of exercise include walking, biking, dancing, and swimming  You can also fit in more physical activity by taking the stairs instead of the elevator or parking farther away from stores  Include muscle strengthening activities 2 days each week  Regular exercise provides many health benefits   It helps you manage your weight, and decreases your risk for type 2 diabetes, heart disease, stroke, and high blood pressure  Exercise can also help improve your mood  Ask your healthcare provider about the best exercise plan for you  General health and safety guidelines:   Do not smoke  Nicotine and other chemicals in cigarettes and cigars can cause lung damage  Ask your healthcare provider for information if you currently smoke and need help to quit  E-cigarettes or smokeless tobacco still contain nicotine  Talk to your healthcare provider before you use these products  Limit alcohol  A drink of alcohol is 12 ounces of beer, 5 ounces of wine, or 1½ ounces of liquor  Lose weight, if needed  Being overweight increases your risk of certain health conditions  These include heart disease, high blood pressure, type 2 diabetes, and certain types of cancer  Protect your skin  Do not sunbathe or use tanning beds  Use sunscreen with a SPF 15 or higher  Apply sunscreen at least 15 minutes before you go outside  Reapply sunscreen every 2 hours  Wear protective clothing, hats, and sunglasses when you are outside  Drive safely  Always wear your seatbelt  Make sure everyone in your car wears a seatbelt  A seatbelt can save your life if you are in an accident  Do not use your cell phone when you are driving  This could distract you and cause an accident  Pull over if you need to make a call or send a text message  Practice safe sex  Use latex condoms if are sexually active and have more than one partner  Your healthcare provider may recommend screening tests for sexually transmitted infections (STIs)  Wear helmets, lifejackets, and protective gear  Always wear a helmet when you ride a bike or motorcycle, go skiing, or play sports that could cause a head injury  Wear protective equipment when you play sports  Wear a lifejacket when you are on a boat or doing water sports      © Copyright [employer] 2022 Information is for End User's use only and may not be sold, redistributed or otherwise used for commercial purposes  All illustrations and images included in CareNotes® are the copyrighted property of A D A M , Inc  or Sherita Washington  The above information is an  only  It is not intended as medical advice for individual conditions or treatments  Talk to your doctor, nurse or pharmacist before following any medical regimen to see if it is safe and effective for you

## 2023-04-03 DIAGNOSIS — Z11.1 SCREENING-PULMONARY TB: Primary | ICD-10-CM

## 2023-04-04 ENCOUNTER — TELEPHONE (OUTPATIENT)
Age: 43
End: 2023-04-04

## 2023-04-04 DIAGNOSIS — Z11.1 ENCOUNTER FOR TB TINE TEST: Primary | ICD-10-CM

## 2023-04-13 LAB
INDURATION: 0 MM
TB SKIN TEST: NEGATIVE

## 2023-07-13 ENCOUNTER — TELEPHONE (OUTPATIENT)
Age: 43
End: 2023-07-13

## 2024-04-30 ENCOUNTER — OFFICE VISIT (OUTPATIENT)
Age: 44
End: 2024-04-30
Payer: COMMERCIAL

## 2024-04-30 VITALS
HEIGHT: 71 IN | OXYGEN SATURATION: 98 % | WEIGHT: 202.2 LBS | RESPIRATION RATE: 16 BRPM | BODY MASS INDEX: 28.31 KG/M2 | SYSTOLIC BLOOD PRESSURE: 128 MMHG | HEART RATE: 78 BPM | DIASTOLIC BLOOD PRESSURE: 72 MMHG | TEMPERATURE: 97.8 F

## 2024-04-30 DIAGNOSIS — Z00.00 HEALTHCARE MAINTENANCE: Primary | ICD-10-CM

## 2024-04-30 DIAGNOSIS — R73.9 ELEVATED BLOOD SUGAR: ICD-10-CM

## 2024-04-30 DIAGNOSIS — Z12.5 PROSTATE CANCER SCREENING: ICD-10-CM

## 2024-04-30 DIAGNOSIS — E78.5 DYSLIPIDEMIA: ICD-10-CM

## 2024-04-30 DIAGNOSIS — J30.9 CHRONIC ALLERGIC RHINITIS: ICD-10-CM

## 2024-04-30 PROCEDURE — 3725F SCREEN DEPRESSION PERFORMED: CPT | Performed by: FAMILY MEDICINE

## 2024-04-30 PROCEDURE — 99396 PREV VISIT EST AGE 40-64: CPT | Performed by: FAMILY MEDICINE

## 2024-04-30 PROCEDURE — 99214 OFFICE O/P EST MOD 30 MIN: CPT | Performed by: FAMILY MEDICINE

## 2024-04-30 RX ORDER — FLUTICASONE PROPIONATE 50 MCG
1 SPRAY, SUSPENSION (ML) NASAL DAILY
Qty: 15.8 ML | Refills: 11 | Status: SHIPPED | OUTPATIENT
Start: 2024-04-30

## 2024-04-30 NOTE — PATIENT INSTRUCTIONS
Together as a team our goal is to practice preventative care, avoid chronic diseases, and/or avoid further progression of current chronic diseases.   Here are my recommendations as we discussed during our office visit:    Exercise:  150 minutes of moderate intensity workout for overall general health  200-300 minutes of moderate intensity workout for weight loss.   The first 30 minutes of exercising, the body will take energy from what we ate that day. Then after that 30-minute luann, the body will take energy from the stored fats.  Therefore, it will be more beneficial to do longer sessions and less frequently in a week to help with our weight loss journey.  I would recommend 60-minute sessions 4 times a week   Strength training 2 times a week for good bone health    Nutritional intake (diet):  Remember, it is not about finding a diet to lose weight quickly, rather adjusting your lifestyle for healthy living long-term.   When we build good habits, it does not become difficult to maintain it.  Focus on a low-carb diet.  The best diet is Mediterranean diet, which is a low-carb diet.  There are good carbs and bad carbs, minimize the bad carbs.    Bad carbs: Refined carbohydrates or simple carbohydrates that have been processed and stripped of their natural fiber and nutrients.          These carbohydrates can lead to rapid spikes in blood sugar levels and are often associated with low nutritional value. The big 4: Bread, Rice, Pasta, Potatoes  Refined grains-white bread, white rice, pasta made from refined flour.  Sugary foods and beverages: Candy, pastries, sugary cereals, soda, and other sugary drinks.  Processed snacks: Chips, cookies, sugary granola bars  Sweetened breakfast cereals: Cereals with added sugars.  Sweets and desserts: Cakes, ice cream, pies  Good carbs: These are referred to complex carbohydrates that are unprocessed or minimally processed, providing more nutrients.  Here examples of good  carbs:  Whole grains: Brown rice, quinoa, oats, whole-wheat products   Legumes: Lentils, chickpeas, black beans, kidney beans  Starchy vegetables: Sweet potatoes, butternut squash  Whole fruits: Berries, apples, oranges, bananas  Vegetables: Broccoli, spinach, kale, [place] sprouts  Nuts and seeds: Almonds, walnuts, Blu seeds, flaxseeds.    Focus on eating whole foods.  What are whole foods?  Whole Foods refer to natural, unprocessed, or minimally processed foods that are as close to the original form as possible.  These foods are in their natural state and have undergone little to no refined or alteration.  Whole Foods are valued for their nutrient density, providing essential vitamins, minerals, fiber, and other beneficial compounds.  Examples of whole foods include:  Fruits and vegetables without added preservatives or processing.    Whole grains-unrefined grains like brown rice, quinoa, and whole-wheat, which retain their brain, germ, and endosperm.  Legumes-beans, lentils, and peas in their national unprocessed date.  Nuts and seeds-on roasted, unsalted nuts and seeds without added oils or seasonings.  Meat and fish-fresh, unprocessed meats and fish without additives or preservatives.  Dairy-unprocessed dairy products such as milk, yogurt, and cheese without added sugars or artificial ingredients.  Eggs-eggs in their natural form without additives.    Protein requirement  Calculate your protein requirement in grams by multiplying your weight in kilograms by the recommended protein intake per kilogram.  This gives you an estimate of your daily protein requirement.  Age 15-18: 0.9 grams of protein per kilogram of body weight from male gender, 0.9 g of protein per kilogram of body weight for female gender.  Age 19+: 0.8 g of protein per kilogram of body weight for both male and female gender.  If you are physically active or trying to build muscle: 1.2-2.2 g/kg  Example: if you weigh 70 kg, to calculate your  protein intake per day multiply 70 by 0.8 = 56 grams per day  To convert your weight to kilograms from pounds: Take your weight in pounds and divided by 2.2046 to get your weight to kilograms.    Sodium/salt  Compare sodium in foods like soup, bread, frozen and packaged meals, then choose the one with lower numbers.  Most Americans should limit their sodium intake to less than 2,300 mg/day.  All -Americans, people with diabetes, high blood pressure, kidney disease, should limit their sodium intake to 1,500 mg/day.    Cooking oil  Avoid the following oil for cooking: Canola, corn, vegetable, sunflower, peanut, sesame, grapeseed, flaxseed.  Even though it states it is heart healthy, however, they are significantly processed and refined.   Would recommend instead the following cooking oil: Olive oil, coconut oil, avocado oil, ghee, butter.    Intermittent fasting:   There are several benefits of intermittent fasting including weight loss, improving insulin sensitivity, improving cardiovascular health by reducing risk factors like blood pressure, cholesterol levels, and triglycerides. It also promotes brain health, longevity, reduction in inflammatory markers, improves metabolic health, and some studies even suggest intermittent fasting to be protective against certain types of cancers.     The goal of intermittent fasting is to shutdown the insulin hormone, as we discussed, which is a hormone that negatively affects our health when it is around in high levels chronically.     Start intermittent fasting for 14 hours initially, then increase to 16 hours, with a goal to reach 18 hours.  During fasting - may drink water without any additives such as sweeteners, black coffee, tea without any additives including milk.   Eat nutritious meals 2 times a day  Avoid snacking in between meals.  If you end up snacking, snack on fruits/vegetables.  Initially it might be difficult, however, over time you will notice a positive  change in your energy, mood, and weight.

## 2024-04-30 NOTE — PROGRESS NOTES
[facility] PRIMARY [facility]ARE  [address] [place]    Name: Tae Roblero      YOB: 1980      MRN: [id number]  Encounter Provider: Sunday Connolly MD      Encounter Date: 04/30/24      ASSESSMENT & PLAN      Alcohol/drug use: discussed moderation in alcohol intake, the recommendations for healthy alcohol use, and avoidance of illicit drug use.  Dental Health: discussed importance of regular tooth brushing, flossing, and dental visits.  Injury prevention: discussed safety/seat belts, safety helmets, smoke detectors, carbon dioxide detectors, and smoking near bedding or upholstery.  Sexual health: discussed sexually transmitted diseases, partner selection, use of condoms, avoidance of unintended pregnancy, and contraceptive alternatives.  Exercise: the importance of regular exercise/physical activity was discussed. Recommend exercise 3-5 times per week for at least 30 minutes.       Depression Screening and Follow-up Plan: Patient was screened for depression during today's encounter. They screened negative with a PHQ-2 score of 0.        Assessment/Plan      Healthcare Maintenance  Health maintenance completed today.  - Medical history reviewed, including existing medical conditions, medications, and surgeries.   - Labs discussed to evaluate cholesterol, blood sugar, kidney function, liver function, and other important markers of health.  - BMI evaluated and discussed.  - Lifestyle and health counseling completed including diet, exercise habits, smoking status, alcohol consumption.   - Bone & Heart health reviewed  - Cancer screenings discussed: CT lung/prostate/colonoscopy.   - Vaccination status reviewed and pertinent immunizations and booster shots discussed.  - Skin examination: Discussed importance of sunscreen and other preventative measures for skin cancer.  - Mental health and wellbeing evaluated and discussed.  - Family  "history obtained to identify any of hereditary health risks.   Lab orders in place as discussed  Start/continue preventative measures as discussed/advised  Complete preventative orders in place as recommended.   Refer to screenings problem list  Return in 2 to 4 weeks after completion of labs.    Screenings  Colonoscopy Not on file Not on file  Cologuard/FIT Not on file   CT lung    Prostate No results found for: \"PSA\"  PSA discussed with patient, blood work in place.    Dyslipidemia, elevated blood sugars, BMI 28-20 0.9  Elevated LDL of the past  Follow-up with blood work, return in 2 to 4 weeks after completion of labs    Chronic allergic rhinitis  Takes Zyrtec once in a while and notices some improvement.  Mostly has postnasal drip that leads to nightmares.  Start Flonase daily nasal spray, discussed may take up to 10 days for it to start working.  Take Zyrtec as needed.        DIAGNOSIS & ORDERS     Diagnoses and all orders for this visit:    Healthcare maintenance  -     fluticasone (FLONASE) 50 mcg/act nasal spray; 1 spray into each nostril daily  -     CBC and differential; Future  -     Comprehensive metabolic panel; Future  -     Lipid Panel with Direct LDL reflex; Future  -     Vitamin D 25 hydroxy; Future  -     TSH, 3rd generation with Free T4 reflex; Future  -     Hemoglobin A1C; Future    Chronic allergic rhinitis  -     fluticasone (FLONASE) 50 mcg/act nasal spray; 1 spray into each nostril daily    Dyslipidemia  -     Lipid Panel with Direct LDL reflex; Future  -     TSH, 3rd generation with Free T4 reflex; Future    Elevated blood sugar  -     Comprehensive metabolic panel; Future  -     Hemoglobin A1C; Future    BMI 28.0-28.9,adult    Prostate cancer screening  -     PSA, Total Screen; Future            FOLLOW-UP PLANS   Return in about 2 weeks (around 5/14/2024) for after completion of labs.        Subjective:     Tae Roblero is a 44 y.o. who is here for annual physical exam.    Tae BENNETT" Osbaldo reports living with wife and 3 kids.   [employer]/Work: run brewing company.           Concerns today: No    Diet and Physical Activity  Diet: well balanced diet  Exercise: moderate cardiovascular exercise and 5-7 times a week on average    General Health  Sleep: gets 7-8 hours of sleep on average   Hearing: normal - bilateral  Vision: wears contacts  Dental: regular dental visits and brushes teeth twice daily    Mental Health  PHQ-2/9 Depression Screening    Little interest or pleasure in doing things: 0 - not at all  Feeling down, depressed, or hopeless: 0 - not at all  PHQ-2 Score: 0  PHQ-2 Interpretation: Negative depression screen       Anxiety: No  History of SI/SH: No  Significant past trauma that has impacted patient's mental health: Yes, describe: autistic son.     Health  Urinary symptoms: none  Sexually Active: Yes   single partner, contraception - vasectomy    Smoking/Alcohol Use:  Smoking Cig: No  Vaping: No  Recreational drugs: Yes, describe: rarely  Alcohol consumption: Yes, describe: 1-2 beer 3 times a week    Review of Systems   Constitutional:  Negative for chills, fever and unexpected weight change.   HENT:  Negative for congestion, rhinorrhea and sore throat.    Eyes:  Negative for visual disturbance.   Respiratory:  Negative for chest tightness, shortness of breath and wheezing.    Cardiovascular:  Negative for chest pain.   Gastrointestinal:  Negative for abdominal pain, constipation, diarrhea, nausea and vomiting.   Endocrine: Negative for polyuria.   Genitourinary:  Negative for dysuria and hematuria.   Skin:  Negative for rash.   Neurological:  Negative for dizziness, weakness, light-headedness and headaches.   Psychiatric/Behavioral:  Negative for confusion.        Allergies:   No Known Allergies     Social history:   Social Determinants of Health     Tobacco Use: Low Risk  (4/30/2024)    Patient History     Smoking Tobacco Use: Never     Smokeless Tobacco Use: Never     Passive  Exposure: Not on file   Alcohol Use: Alcohol Misuse (11/19/2020)    AUDIT-C     Frequency of Alcohol Consumption: 2-3 times a week     Average Number of Drinks: 3 or 4     Frequency of Binge Drinking: Never   Financial Resource Strain: Not on file   Food Insecurity: Not on file   Transportation Needs: Not on file   Physical Activity: Insufficiently Active (11/22/2021)    Exercise Vital Sign     Days of Exercise per Week: 2 days     Minutes of Exercise per Session: 60 min   Stress: No Stress Concern Present (11/22/2021)    [employer] - Occupational Stress Questionnaire     Feeling of Stress : Not at all   Social Connections: Not on file   Intimate Partner Violence: Not on file   Depression: Not at risk (4/30/2024)    PHQ-2     PHQ-2 Score: 0   Housing Stability: Not on file   Utilities: Not on file   Health Literacy: Not on file        Surgical history:   Past Surgical History:   Procedure Laterality Date    LUMBAR SPINE SURGERY  10/07/2015    Dr. [facility]    GA VAZQUEZ FACETECTOMY & FORAMOTOMY 1 VRT SGM LUMBAR Left 03/12/2019    Procedure: L5-S1 MINIMALLY INVASIVE LAMINAL FORAMINOTOMY AND POSSIBLE MICRODISCECTOMY WITH POSSIBLE EPIDURAL STEROID INJECTION;  Surgeon: Franklin Tijerina MD;  Location: [facility];  Service: Neurosurgery    GA LAMOT PRTL FFD EXC DISC REEXPL 1 NTRSPC LUMBAR Left 12/11/2019    Procedure: reopening of lumbar incision for minimally invasive left L5-S1 microdiskectomy and epidural steroid injection;  Surgeon: Franklin Tijerina MD;  Location: [facility];  Service: Neurosurgery    SPINE SURGERY  3 disc ectomies        Family history:  Family History   Problem Relation Age of Onset    Lumbar disc disease Father     Asthma Father     Cancer Mother           Current Medication List:     Current Outpatient Medications:     fluticasone (FLONASE) 50 mcg/act nasal spray, 1 spray into each nostril daily, Disp: 15.8 mL, Rfl: 11    multivitamin (THERAGRAN) TABS, Take 1  tablet by mouth daily, Disp: , Rfl:       Objective:     Vitals:    04/30/24 1612   BP: 128/72   Pulse: 78   Resp: 16   Temp: 97.8 °F (36.6 °C)   SpO2: 98%       Physical Exam  Vitals reviewed.   Constitutional:       General: He is not in acute distress.     Appearance: Normal appearance. He is not ill-appearing, toxic-appearing or diaphoretic.   HENT:      Head: Normocephalic and atraumatic.      Right Ear: External ear normal.      Left Ear: External ear normal.      Nose: Nose normal.      Mouth/Throat:      Mouth: Mucous membranes are moist.   Eyes:      General: No scleral icterus.        Right eye: No discharge.         Left eye: No discharge.      Extraocular Movements: Extraocular movements intact.      Conjunctiva/sclera: Conjunctivae normal.   Cardiovascular:      Rate and Rhythm: Normal rate and regular rhythm.      Pulses: Normal pulses.      Heart sounds: Normal heart sounds.   Pulmonary:      Effort: Pulmonary effort is normal. No respiratory distress.      Breath sounds: Normal breath sounds.   Abdominal:      Palpations: Abdomen is soft.      Tenderness: There is no abdominal tenderness.   Musculoskeletal:         General: No swelling. Normal range of motion.      Cervical back: Normal range of motion.   Skin:     General: Skin is warm and dry.   Neurological:      General: No focal deficit present.      Mental Status: He is alert and oriented to person, place, and time.   Psychiatric:         Mood and Affect: Mood normal.         Behavior: Behavior normal.         Thought Content: Thought content normal.           Sunday Connolly MD  Family Medicine Physician   [facility] PRIMARY [facility] [place]

## 2024-05-02 ENCOUNTER — APPOINTMENT (OUTPATIENT)
Age: 44
End: 2024-05-02
Payer: COMMERCIAL

## 2024-05-02 DIAGNOSIS — R73.9 ELEVATED BLOOD SUGAR: ICD-10-CM

## 2024-05-02 DIAGNOSIS — Z12.5 PROSTATE CANCER SCREENING: ICD-10-CM

## 2024-05-02 DIAGNOSIS — Z00.00 HEALTHCARE MAINTENANCE: ICD-10-CM

## 2024-05-02 DIAGNOSIS — E78.5 DYSLIPIDEMIA: ICD-10-CM

## 2024-05-02 LAB
25(OH)D3 SERPL-MCNC: 22.3 NG/ML (ref 30–100)
ALBUMIN SERPL BCP-MCNC: 4.6 G/DL (ref 3.5–5)
ALP SERPL-CCNC: 46 U/L (ref 34–104)
ALT SERPL W P-5'-P-CCNC: 24 U/L (ref 7–52)
ANION GAP SERPL CALCULATED.3IONS-SCNC: 7 MMOL/L (ref 4–13)
AST SERPL W P-5'-P-CCNC: 26 U/L (ref 13–39)
BASOPHILS # BLD AUTO: 0.02 THOUSANDS/ÂΜL (ref 0–0.1)
BASOPHILS NFR BLD AUTO: 0 % (ref 0–1)
BILIRUB SERPL-MCNC: 0.72 MG/DL (ref 0.2–1)
BUN SERPL-MCNC: 13 MG/DL (ref 5–25)
CALCIUM SERPL-MCNC: 9.3 MG/DL (ref 8.4–10.2)
CHLORIDE SERPL-SCNC: 104 MMOL/L (ref 96–108)
CHOLEST SERPL-MCNC: 212 MG/DL
CO2 SERPL-SCNC: 29 MMOL/L (ref 21–32)
CREAT SERPL-MCNC: 1.11 MG/DL (ref 0.6–1.3)
EOSINOPHIL # BLD AUTO: 0.04 THOUSAND/ÂΜL (ref 0–0.61)
EOSINOPHIL NFR BLD AUTO: 1 % (ref 0–6)
ERYTHROCYTE [DISTWIDTH] IN BLOOD BY AUTOMATED COUNT: 12.2 % (ref 11.6–15.1)
EST. AVERAGE GLUCOSE BLD GHB EST-MCNC: 114 MG/DL
GFR SERPL CREATININE-BSD FRML MDRD: 80 ML/MIN/1.73SQ M
GLUCOSE P FAST SERPL-MCNC: 105 MG/DL (ref 65–99)
HBA1C MFR BLD: 5.6 %
HCT VFR BLD AUTO: 47.4 % (ref 36.5–49.3)
HDLC SERPL-MCNC: 56 MG/DL
HGB BLD-MCNC: 15.7 G/DL (ref 12–17)
IMM GRANULOCYTES # BLD AUTO: 0.01 THOUSAND/UL (ref 0–0.2)
IMM GRANULOCYTES NFR BLD AUTO: 0 % (ref 0–2)
LDLC SERPL CALC-MCNC: 142 MG/DL (ref 0–100)
LYMPHOCYTES # BLD AUTO: 2.34 THOUSANDS/ÂΜL (ref 0.6–4.47)
LYMPHOCYTES NFR BLD AUTO: 44 % (ref 14–44)
MCH RBC QN AUTO: 31.1 PG (ref 26.8–34.3)
MCHC RBC AUTO-ENTMCNC: 33.1 G/DL (ref 31.4–37.4)
MCV RBC AUTO: 94 FL (ref 82–98)
MONOCYTES # BLD AUTO: 0.38 THOUSAND/ÂΜL (ref 0.17–1.22)
MONOCYTES NFR BLD AUTO: 7 % (ref 4–12)
NEUTROPHILS # BLD AUTO: 2.57 THOUSANDS/ÂΜL (ref 1.85–7.62)
NEUTS SEG NFR BLD AUTO: 48 % (ref 43–75)
NRBC BLD AUTO-RTO: 0 /100 WBCS
PLATELET # BLD AUTO: 229 THOUSANDS/UL (ref 149–390)
PMV BLD AUTO: 12.1 FL (ref 8.9–12.7)
POTASSIUM SERPL-SCNC: 4.4 MMOL/L (ref 3.5–5.3)
PROT SERPL-MCNC: 7.1 G/DL (ref 6.4–8.4)
PSA SERPL-MCNC: 0.42 NG/ML (ref 0–4)
RBC # BLD AUTO: 5.05 MILLION/UL (ref 3.88–5.62)
SODIUM SERPL-SCNC: 140 MMOL/L (ref 135–147)
TRIGL SERPL-MCNC: 69 MG/DL
TSH SERPL DL<=0.05 MIU/L-ACNC: 2.34 UIU/ML (ref 0.45–4.5)
WBC # BLD AUTO: 5.36 THOUSAND/UL (ref 4.31–10.16)

## 2024-05-02 PROCEDURE — 83036 HEMOGLOBIN GLYCOSYLATED A1C: CPT

## 2024-05-02 PROCEDURE — 84443 ASSAY THYROID STIM HORMONE: CPT

## 2024-05-02 PROCEDURE — 36415 COLL VENOUS BLD VENIPUNCTURE: CPT

## 2024-05-02 PROCEDURE — 80053 COMPREHEN METABOLIC PANEL: CPT

## 2024-05-02 PROCEDURE — 82306 VITAMIN D 25 HYDROXY: CPT

## 2024-05-02 PROCEDURE — 80061 LIPID PANEL: CPT

## 2024-05-02 PROCEDURE — 85025 COMPLETE CBC W/AUTO DIFF WBC: CPT

## 2024-05-02 PROCEDURE — G0103 PSA SCREENING: HCPCS

## 2024-05-13 ENCOUNTER — OFFICE VISIT (OUTPATIENT)
Age: 44
End: 2024-05-13
Payer: COMMERCIAL

## 2024-05-13 VITALS
WEIGHT: 201 LBS | OXYGEN SATURATION: 96 % | DIASTOLIC BLOOD PRESSURE: 90 MMHG | HEART RATE: 72 BPM | SYSTOLIC BLOOD PRESSURE: 132 MMHG | TEMPERATURE: 97.8 F | BODY MASS INDEX: 28.14 KG/M2 | HEIGHT: 71 IN | RESPIRATION RATE: 18 BRPM

## 2024-05-13 DIAGNOSIS — N18.2 CKD (CHRONIC KIDNEY DISEASE) STAGE 2, GFR 60-89 ML/MIN: ICD-10-CM

## 2024-05-13 DIAGNOSIS — E55.9 VITAMIN D DEFICIENCY: ICD-10-CM

## 2024-05-13 DIAGNOSIS — E78.5 DYSLIPIDEMIA: Primary | ICD-10-CM

## 2024-05-13 PROCEDURE — 99214 OFFICE O/P EST MOD 30 MIN: CPT | Performed by: FAMILY MEDICINE

## 2024-05-13 RX ORDER — CHOLECALCIFEROL (VITAMIN D3) 1250 MCG
CAPSULE ORAL
Qty: 24 CAPSULE | Refills: 0 | Status: SHIPPED | OUTPATIENT
Start: 2024-05-13

## 2024-05-13 NOTE — PATIENT INSTRUCTIONS
Recommendations for bone and heart health  Take vitamin D3 50,000 units twice a week for 12 weeks to boost to high-normal levels, prescription sent to pharmacy.  If insurance does not cover or it is too expensive, you can get high-dose vitamin D3 from vitamin shops or on [place]. Can get Vitamin D3 at 10,000 units, take 5 capsules 2 times a week for 12 weeks.   During high-dose boost of vitamin D3, take Vitamin K2 100-200 mcg daily with food. It can be purchased from vitamin [employer] or [place].   Once completed with the high-dose course, begin maintenance dose of vitamin D3 5,000 units-vitamin K2 200 mcg combination pill. Take daily with food to maintain high-normal levels of vitamin D. The vitamin K2 will help to prevent vascular calcification, read texts below.   Take fish oil with omega-3 at dosage of 3609-2705 mg daily to reduce risk of heart disease.   Follow-up with repeat vitamin D blood work after completing the high-dose prescription course to monitor for improvement.       Why is Vitamin D important?  Adequate vitamin D levels reduces the risk of fractures and osteoporosis.  Vitamin D is involved in modulating the immune system, enhancing the body's defense against infections and supporting overall immune function.  Adequate vitamin D levels are associated with better muscle strength and function.  Deficiency may contribute to muscle weakness and an increase risk of falls in older adults.  Multiple research studies have associated low vitamin D with increased risk of autoimmune disease, cancers including breast cancer, and multiple sclerosis.  Some research also link vitamin D deficiency to increase risk of cardiovascular disease. Vitamin D also plays a role in synthesis of various hormones, including insulin.  It may play a role in insulin sensitivity and glucose metabolism. Adequate levels of vitamin D may also plays a role in supporting mental wellbeing.  In conclusion, lets bring your vitamin D levels up  to >65 to prevent many diseases and conditions!     Why is vitamin K2 important?  Vitamin K2 helps ensure that the calcium we obtain through diet/supplements is directed to the bones for proper mineralization, contributing to bone strength and density.  Vitamin K2 also helps to activate proteins that prevent calcium from depositing in the arterial walls and reducing the risk of arterial calcification. Arterial calcification leads to heart and vascular disease. Especially since we are optimizing your vitamin D levels, one of the functions for vitamin D is to absorb calcium from the gastrointestinal track. This will increase calcium levels in the body. Supplementing vitamin K2 will ensure in removing the calcium from the blood vessels. This will reduce calcification of the vessels, reducing risk of heart and vascular disease.  Let's get your calcium where it belongs, into the bones and out of the vessels with vitamin K2!    Why is fish oil with omega-3 important?  Omega-3 fatty acids and fish oil or associated with reduced risk of heart disease.  They help to improve balance of cholesterol by increasing HDL (good cholesterol) and reducing triglycerides. They also reduce blood pressure and have anti-inflammatory effects contributing to overall cardiovascular health.  Omega-threes have anti-inflammatory properties that may benefit individuals with joint conditions such as rheumatoid arthritis, and other chronic inflammatory conditions such as IBD.  They can help reduce inflammation and alleviate symptoms. Let's prevent diseases, avoid the need of medications and start your healthy journey with these supplements (in addition to improving your nutritional intake and regular exercise)!  Recommendations for bone and heart health  Take vitamin D3 50,000 units twice a week for 12 weeks to boost to high-normal levels, prescription sent to pharmacy.  If insurance does not cover or it is too expensive, you can get high-dose  vitamin D3 from vitamin shops or on [employer]. Can get Vitamin D3 at 10,000 units, take 5 capsules 2 times a week for 12 weeks.   During high-dose boost of vitamin D3, take Vitamin K2 100-200 mcg daily with food. It can be purchased from [employer] or [employer].   Once completed with the high-dose course, begin maintenance dose of vitamin D3 5,000 units-vitamin K2 200 mcg combination pill. Take daily with food to maintain high-normal levels of vitamin D. The vitamin K2 will help to prevent vascular calcification, read texts below.   Take fish oil with omega-3 at dosage of 9349-9380 mg daily to reduce risk of heart disease.   Follow-up with repeat vitamin D blood work after completing the high-dose prescription course to monitor for improvement.       Why is Vitamin D important?  Adequate vitamin D levels reduces the risk of fractures and osteoporosis.  Vitamin D is involved in modulating the immune system, enhancing the body's defense against infections and supporting overall immune function.  Adequate vitamin D levels are associated with better muscle strength and function.  Deficiency may contribute to muscle weakness and an increase risk of falls in older adults.  Multiple research studies have associated low vitamin D with increased risk of autoimmune disease, cancers including breast cancer, and multiple sclerosis.  Some research also link vitamin D deficiency to increase risk of cardiovascular disease. Vitamin D also plays a role in synthesis of various hormones, including insulin.  It may play a role in insulin sensitivity and glucose metabolism. Adequate levels of vitamin D may also plays a role in supporting mental wellbeing.  In conclusion, lets bring your vitamin D levels up to >65 to prevent many diseases and conditions!     Why is vitamin K2 important?  Vitamin K2 helps ensure that the calcium we obtain through diet/supplements is directed to the bones for proper mineralization, contributing to bone  strength and density.  Vitamin K2 also helps to activate proteins that prevent calcium from depositing in the arterial walls and reducing the risk of arterial calcification. Arterial calcification leads to heart and vascular disease. Especially since we are optimizing your vitamin D levels, one of the functions for vitamin D is to absorb calcium from the gastrointestinal track. This will increase calcium levels in the body. Supplementing vitamin K2 will ensure in removing the calcium from the blood vessels. This will reduce calcification of the vessels, reducing risk of heart and vascular disease.  Let's get your calcium where it belongs, into the bones and out of the vessels with vitamin K2!    Why is fish oil with omega-3 important?  Omega-3 fatty acids and fish oil or associated with reduced risk of heart disease.  They help to improve balance of cholesterol by increasing HDL (good cholesterol) and reducing triglycerides. They also reduce blood pressure and have anti-inflammatory effects contributing to overall cardiovascular health.  Omega-threes have anti-inflammatory properties that may benefit individuals with joint conditions such as rheumatoid arthritis, and other chronic inflammatory conditions such as IBD.  They can help reduce inflammation and alleviate symptoms. Let's prevent diseases, avoid the need of medications and start your healthy journey with these supplements (in addition to improving your nutritional intake and regular exercise)!            Together as a team our goal is to practice preventative care, avoid chronic diseases, and/or avoid further progression of current chronic diseases.   Here are my recommendations as we discussed during our office visit:    Exercise:  150 minutes of moderate intensity workout for overall general health  200-300 minutes of moderate intensity workout for weight loss.   The first 30 minutes of exercising, the body will take energy from what we ate that day. Then  after that 30-minute luann, the body will take energy from the stored fats.  Therefore, it will be more beneficial to do longer sessions and less frequently in a week to help with our weight loss journey.  I would recommend 60-minute sessions 4 times a week   Strength training 2 times a week for good bone health    Nutritional intake (diet):  Remember, it is not about finding a diet to lose weight quickly, rather adjusting your lifestyle for healthy living long-term.   When we build good habits, it does not become difficult to maintain it.  Focus on a low-carb diet.  The best diet is Mediterranean diet, which is a low-carb diet.  There are good carbs and bad carbs, minimize the bad carbs.    Bad carbs: Refined carbohydrates or simple carbohydrates that have been processed and stripped of their natural fiber and nutrients.          These carbohydrates can lead to rapid spikes in blood sugar levels and are often associated with low nutritional value. The big 4: Bread, Rice, Pasta, Potatoes  Refined grains-white bread, white rice, pasta made from refined flour.  Sugary foods and beverages: Candy, pastries, sugary cereals, soda, and other sugary drinks.  Processed snacks: Chips, cookies, sugary granola bars  Sweetened breakfast cereals: Cereals with added sugars.  Sweets and desserts: Cakes, ice cream, pies  Good carbs: These are referred to complex carbohydrates that are unprocessed or minimally processed, providing more nutrients.  Here examples of good carbs:  Whole grains: Brown rice, quinoa, oats, whole-wheat products   Legumes: Lentils, chickpeas, black beans, kidney beans  Starchy vegetables: Sweet potatoes, butternut squash  Whole fruits: Berries, apples, oranges, bananas  Vegetables: Broccoli, spinach, kale, [place] sprouts  Nuts and seeds: Almonds, walnuts, Blu seeds, flaxseeds.    Focus on eating whole foods.  What are whole foods?  Whole Foods refer to natural, unprocessed, or minimally processed foods that  are as close to the original form as possible.  These foods are in their natural state and have undergone little to no refined or alteration.  Whole Foods are valued for their nutrient density, providing essential vitamins, minerals, fiber, and other beneficial compounds.  Examples of whole foods include:  Fruits and vegetables without added preservatives or processing.    Whole grains-unrefined grains like brown rice, quinoa, and whole-wheat, which retain their brain, germ, and endosperm.  Legumes-beans, lentils, and peas in their national unprocessed date.  Nuts and seeds-on roasted, unsalted nuts and seeds without added oils or seasonings.  Meat and fish-fresh, unprocessed meats and fish without additives or preservatives.  Dairy-unprocessed dairy products such as milk, yogurt, and cheese without added sugars or artificial ingredients.  Eggs-eggs in their natural form without additives.    Protein requirement  Calculate your protein requirement in grams by multiplying your weight in kilograms by the recommended protein intake per kilogram.  This gives you an estimate of your daily protein requirement.  Age 15-18: 0.9 grams of protein per kilogram of body weight from male gender, 0.9 g of protein per kilogram of body weight for female gender.  Age 19+: 0.8 g of protein per kilogram of body weight for both male and female gender.  If you are physically active or trying to build muscle: 1.2-2.2 g/kg  Example: if you weigh 70 kg, to calculate your protein intake per day multiply 70 by 0.8 = 56 grams per day  To convert your weight to kilograms from pounds: Take your weight in pounds and divided by 2.2046 to get your weight to kilograms.    Sodium/salt  Compare sodium in foods like soup, bread, frozen and packaged meals, then choose the one with lower numbers.  Most Americans should limit their sodium intake to less than 2,300 mg/day.  All -Americans, people with diabetes, high blood pressure, kidney disease,  should limit their sodium intake to 1,500 mg/day.    Cooking oil  Avoid the following oil for cooking: Canola, corn, vegetable, sunflower, peanut, sesame, grapeseed, flaxseed.  Even though it states it is heart healthy, however, they are significantly processed and refined.   Would recommend instead the following cooking oil: Olive oil, coconut oil, avocado oil, ghee, butter.    Intermittent fasting:   There are several benefits of intermittent fasting including weight loss, improving insulin sensitivity, improving cardiovascular health by reducing risk factors like blood pressure, cholesterol levels, and triglycerides. It also promotes brain health, longevity, reduction in inflammatory markers, improves metabolic health, and some studies even suggest intermittent fasting to be protective against certain types of cancers.     The goal of intermittent fasting is to shutdown the insulin hormone, as we discussed, which is a hormone that negatively affects our health when it is around in high levels chronically.     Start intermittent fasting for 14 hours initially, then increase to 16 hours, with a goal to reach 18 hours.  During fasting - may drink water without any additives such as sweeteners, black coffee, tea without any additives including milk.   Eat nutritious meals 2 times a day  Avoid snacking in between meals.  If you end up snacking, snack on fruits/vegetables.  Initially it might be difficult, however, over time you will notice a positive change in your energy, mood, and weight.

## 2024-05-13 NOTE — PROGRESS NOTES
[facility] PRIMARY CARE  [address] [place]    Name: Tae Roblero   YOB: 1980   MRN: [id number]  Encounter Provider: Sunday Connolly MD    Encounter Date: 05/13/24       ASSESSMENT & PLAN      Assessment/Plan     Dyslipidemia, BMI 28-20 8.9  Currently not on statin.   Reviewed & discussed labs including lipid panel   Triglycerides/HDL ratio: 1.23. Goal <2, 05/02/2024   Discussed importance of nutritional intake, exercising regularly.  Decrease calorie intake and advised low-carb diet as discussed   Incorporate omega-3 in your diet and start fish oil with omega-3, 2 g daily as discussed.  Limit cholesterol-rich foods as discussed  Monitor lipid panel in 6 months prior to next appointment.    Chronic kidney disease stage II mild  Lab Results   Component Value Date    EGFR 80 05/02/2024    EGFR 108 01/24/2022    EGFR 107 12/22/2020    CREATININE 1.11 05/02/2024    CREATININE 0.85 01/24/2022    CREATININE 0.88 12/22/2020   Labs reviewed & discussed   Improve lifestyle and dietary changes to slow its progression  Follow-up with BMP in 6 months prior to next appointment   improve lifestyle and dietary changes to slow its progression  Low-sodium, low-carb diet, limit snacking, exercise regularly.  Avoid nephrotoxic agents including over-the-counter NSAIDs as discussed  Hydrate well with water.     Vitamin D deficiency  Reviewed & Discussed labs  Deficient vitamin D levels  Goal , preventative therapy   Start vitamin D3 50,000 units two times a week for 12 weeks, printout provided  After weekly prescription dose, start vitamin D3 5,000 units daily supplements from over-the-counter   Follow-up with vitamin D level after completion of 12-week course   Advised vitamin K2 100-200 mcg daily, preventative measures for vascular calcification.           Depression Screening and Follow-up Plan: Patient was screened for depression during  "today's encounter. They screened negative with a PHQ-2 score of 0.         DIAGNOSIS & ORDERS     Diagnoses and all orders for this visit:    Dyslipidemia  -     Lipid Panel with Direct LDL reflex; Future  -     Basic metabolic panel; Future    CKD (chronic kidney disease) stage 2, GFR 60-89 ml/min  -     Basic metabolic panel; Future    Vitamin D deficiency  -     Cholecalciferol (Vitamin D3) 1.25 MG (19310 UT) CAPS; 2 times a week for 12 weeks.  -     Vitamin D 25 hydroxy; Future    BMI 28.0-28.9,adult            FOLLOW-UP PLANS   Return in about 6 months (around 11/13/2024) for Routine follow up.        Subjective:     Tae Roblero is a 44 y.o.     Tae Roblero reports living with wife and 3 kids.   [employer]/Work: run brewing company.           Review of Systems    Current Medication List:     Current Outpatient Medications:     Cholecalciferol (Vitamin D3) 1.25 MG (78293 UT) CAPS, 2 times a week for 12 weeks., Disp: 24 capsule, Rfl: 0    fluticasone (FLONASE) 50 mcg/act nasal spray, 1 spray into each nostril daily, Disp: 15.8 mL, Rfl: 11    multivitamin (THERAGRAN) TABS, Take 1 tablet by mouth daily, Disp: , Rfl:       Objective:     /90 (BP Location: Right arm, Patient Position: Sitting, Cuff Size: Standard)   Pulse 72   Temp 97.8 °F (36.6 °C) (Tympanic)   Resp 18   Ht 5' 10.5\" (1.791 m)   Wt 91.2 kg (201 lb)   SpO2 96%   BMI 28.43 kg/m²      Physical Exam  Vitals reviewed.   Constitutional:       General: He is not in acute distress.     Appearance: Normal appearance. He is not ill-appearing, toxic-appearing or diaphoretic.   HENT:      Head: Normocephalic and atraumatic.      Right Ear: External ear normal.      Left Ear: External ear normal.      Nose: No congestion or rhinorrhea.   Eyes:      General: No scleral icterus.        Right eye: No discharge.         Left eye: No discharge.      Conjunctiva/sclera: Conjunctivae normal.   Cardiovascular:      Rate and Rhythm: Normal rate. "   Pulmonary:      Effort: Pulmonary effort is normal. No respiratory distress.   Neurological:      General: No focal deficit present.      Mental Status: He is alert and oriented to person, place, and time.   Psychiatric:         Mood and Affect: Mood normal.         Behavior: Behavior normal.         Thought Content: Thought content normal.           Sunday Connolly MD  Family Medicine Physician   [facility]

## 2024-05-22 ENCOUNTER — OFFICE VISIT (OUTPATIENT)
Age: 44
End: 2024-05-22
Payer: COMMERCIAL

## 2024-05-22 VITALS
TEMPERATURE: 97.5 F | BODY MASS INDEX: 28.38 KG/M2 | HEART RATE: 74 BPM | RESPIRATION RATE: 18 BRPM | SYSTOLIC BLOOD PRESSURE: 126 MMHG | WEIGHT: 200.6 LBS | DIASTOLIC BLOOD PRESSURE: 86 MMHG | OXYGEN SATURATION: 97 %

## 2024-05-22 DIAGNOSIS — J30.9 CHRONIC ALLERGIC RHINITIS: ICD-10-CM

## 2024-05-22 DIAGNOSIS — Z20.818 STREPTOCOCCUS EXPOSURE: Primary | ICD-10-CM

## 2024-05-22 DIAGNOSIS — Z00.00 HEALTHCARE MAINTENANCE: ICD-10-CM

## 2024-05-22 LAB — S PYO AG THROAT QL: NEGATIVE

## 2024-05-22 PROCEDURE — 99213 OFFICE O/P EST LOW 20 MIN: CPT | Performed by: PHYSICIAN ASSISTANT

## 2024-05-22 PROCEDURE — 87070 CULTURE OTHR SPECIMN AEROBIC: CPT | Performed by: PHYSICIAN ASSISTANT

## 2024-05-22 PROCEDURE — 87880 STREP A ASSAY W/OPTIC: CPT | Performed by: PHYSICIAN ASSISTANT

## 2024-05-22 RX ORDER — FLUTICASONE PROPIONATE 50 MCG
SPRAY, SUSPENSION (ML) NASAL
Qty: 48 ML | Refills: 1 | Status: SHIPPED | OUTPATIENT
Start: 2024-05-22

## 2024-05-22 NOTE — PROGRESS NOTES
[facility]        NAME: Tae Roblero is a 44 y.o. male  : 1980    MRN: [id number]  DATE: May 22, 2024  TIME: 5:28 PM    Assessment and Plan   Streptococcus exposure [Z20.818]  1. Streptococcus exposure  POCT rapid strepA    Throat culture            Patient Instructions       Your rapid strep was negative however we will send the throat culture to confirm and if this is positive we will contact you to discuss a new treatment plan.  This is viral pharyngitis and so an antibiotic is not required.   Warm water gargles with salt 3 times daily.  E-Z throat lozenges.  Tylenol or Advil for pain and discomfort  Stay well-hydrated.         Follow up with PCP in 3-5 days.  Proceed to  ER if symptoms worsen.    If tests are performed, our office will contact you with results only if changes need to made to the care plan discussed with you at the visit. You can review your full results on [facility].    Chief Complaint     Chief Complaint   Patient presents with    Strep exposure      Patient was exposed to strep. C/o fever, body aches and fatigue 2 days ago.          History of Present Illness       Sore Throat   This is a new problem. The current episode started in the past 7 days. The problem has been gradually improving. Maximum temperature: Tactile fever. The fever has been present for Less than 1 day. The pain is moderate. Pertinent negatives include no abdominal pain, diarrhea or vomiting. He has had exposure to strep. He has tried nothing for the symptoms. The treatment provided no relief.       Review of Systems   Review of Systems   Constitutional:  Negative for activity change, appetite change and fever.   HENT:  Positive for sore throat.    Gastrointestinal:  Negative for abdominal pain, diarrhea and vomiting.         Current Medications       Current Outpatient Medications:     Cholecalciferol (Vitamin D3) 1.25 MG (62523 UT) CAPS, 2 times a week for 12 weeks., Disp: 24 capsule, Rfl:  0    fluticasone (FLONASE) 50 mcg/act nasal spray, SPRAY 1 SPRAY INTO EACH NOSTRIL EVERY DAY, Disp: 48 mL, Rfl: 1    multivitamin (THERAGRAN) TABS, Take 1 tablet by mouth daily, Disp: , Rfl:     Current Allergies     Allergies as of 05/22/2024    (No Known Allergies)            The following portions of the patient's history were reviewed and updated as appropriate: allergies, current medications, past family history, past medical history, past social history, past surgical history and problem list.     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 [facility]    [place] VAZQUEZ FACETECTOMY & FORAMOTOMY 1 VRT [facility] LUMBAR Left 03/12/2019    Procedure: L5-S1 MINIMALLY INVASIVE LAMINAL FORAMINOTOMY AND POSSIBLE MICRODISCECTOMY WITH POSSIBLE EPIDURAL STEROID INJECTION;  Surgeon: Franklin Tijerina MD;  [facility];  Service: Neurosurgery    [place] [facility]    Procedure: reopening of lumbar incision for minimally invasive left L5-S1 microdiskectomy and epidural steroid injection;  Surgeon: Franklin Tijerina MD;  [facility];  Service: Neurosurgery    SPINE SURGERY  3 disc ectomies       Family History   Problem Relation Age of Onset    Lumbar disc disease Father     Asthma Father     Cancer Mother          Medications have been verified.        Objective   /86   Pulse 74   Temp 97.5 °F (36.4 °C)   Resp 18   Wt 91 kg (200 lb 9.6 oz)   SpO2 97%   BMI 28.38 kg/m²        Physical Exam      Physical Exam  Vitals and nursing note reviewed.   Constitutional:       General: He is not in acute distress.     Appearance: Normal appearance. He is normal weight. He is not ill-appearing, toxic-appearing or diaphoretic.   HENT:      Head: Normocephalic and atraumatic.      Right Ear: Tympanic membrane, ear canal and external ear normal.      Left Ear: Tympanic membrane, ear canal and external ear normal.      Nose: Congestion present. No rhinorrhea.      Mouth/Throat:      Mouth: Mucous membranes are moist.      Pharynx: Posterior oropharyngeal erythema present. No oropharyngeal exudate.   Eyes:      General: No scleral icterus.     Extraocular Movements: Extraocular movements intact.      Conjunctiva/sclera: Conjunctivae normal.      Pupils: Pupils are equal, round, and reactive to light.   Cardiovascular:      Rate and Rhythm: Normal rate and regular rhythm.      Pulses: Normal pulses.      Heart sounds: Normal heart sounds.   Pulmonary:      Effort: Pulmonary effort is normal. No respiratory distress.      Breath sounds: Normal breath sounds. No wheezing, rhonchi or rales.   Musculoskeletal:         General: Normal range of motion.      Cervical back: Normal range of motion and neck supple. No tenderness.   Lymphadenopathy:      Cervical: No cervical adenopathy.   Skin:     General: Skin is warm and dry.      Capillary Refill: Capillary refill takes less than 2 seconds.      Findings: No rash.   Neurological:      General: No focal deficit present.      Mental Status: He is alert and oriented to person, place, and time.      Coordination: Coordination normal.      Gait: Gait normal.   Psychiatric:         Mood and Affect: Mood normal.         Behavior: Behavior normal.         Thought Content: Thought content normal.         Judgment: Judgment normal.

## 2024-05-22 NOTE — LETTER
May 22, 2024     Patient: Tae Roblero   YOB: 1980   Date of Visit: 5/22/2024       To Whom it May Concern:    Tae Roblero was seen in my clinic on 5/22/2024. He may return to work on 5/23/2024 .    If you have any questions or concerns, please don't hesitate to call.         Sincerely,          Sachin Gray PA-C        CC: No Recipients

## 2024-05-24 LAB — BACTERIA THROAT CULT: NORMAL

## 2024-11-14 ENCOUNTER — APPOINTMENT (OUTPATIENT)
Age: 44
End: 2024-11-14
Payer: COMMERCIAL

## 2024-11-14 ENCOUNTER — RESULTS FOLLOW-UP (OUTPATIENT)
Age: 44
End: 2024-11-14

## 2024-11-14 DIAGNOSIS — N18.2 CKD (CHRONIC KIDNEY DISEASE) STAGE 2, GFR 60-89 ML/MIN: ICD-10-CM

## 2024-11-14 DIAGNOSIS — E78.5 DYSLIPIDEMIA: ICD-10-CM

## 2024-11-14 DIAGNOSIS — E55.9 VITAMIN D DEFICIENCY: ICD-10-CM

## 2024-11-14 LAB
25(OH)D3 SERPL-MCNC: 35.6 NG/ML (ref 30–100)
ANION GAP SERPL CALCULATED.3IONS-SCNC: 6 MMOL/L (ref 4–13)
BUN SERPL-MCNC: 20 MG/DL (ref 5–25)
CALCIUM SERPL-MCNC: 8.8 MG/DL (ref 8.4–10.2)
CHLORIDE SERPL-SCNC: 103 MMOL/L (ref 96–108)
CHOLEST SERPL-MCNC: 220 MG/DL (ref ?–200)
CO2 SERPL-SCNC: 30 MMOL/L (ref 21–32)
CREAT SERPL-MCNC: 0.92 MG/DL (ref 0.6–1.3)
GFR SERPL CREATININE-BSD FRML MDRD: 100 ML/MIN/1.73SQ M
GLUCOSE P FAST SERPL-MCNC: 94 MG/DL (ref 65–99)
HDLC SERPL-MCNC: 53 MG/DL
LDLC SERPL CALC-MCNC: 139 MG/DL (ref 0–100)
POTASSIUM SERPL-SCNC: 4.4 MMOL/L (ref 3.5–5.3)
SODIUM SERPL-SCNC: 139 MMOL/L (ref 135–147)
TRIGL SERPL-MCNC: 141 MG/DL (ref ?–150)

## 2024-11-14 PROCEDURE — 36415 COLL VENOUS BLD VENIPUNCTURE: CPT

## 2024-11-14 PROCEDURE — 82306 VITAMIN D 25 HYDROXY: CPT

## 2024-11-14 PROCEDURE — 80048 BASIC METABOLIC PNL TOTAL CA: CPT

## 2024-11-14 PROCEDURE — 80061 LIPID PANEL: CPT

## 2024-11-18 ENCOUNTER — OFFICE VISIT (OUTPATIENT)
Age: 44
End: 2024-11-18
Payer: COMMERCIAL

## 2024-11-18 VITALS
SYSTOLIC BLOOD PRESSURE: 122 MMHG | RESPIRATION RATE: 14 BRPM | BODY MASS INDEX: 28.22 KG/M2 | HEART RATE: 88 BPM | OXYGEN SATURATION: 97 % | HEIGHT: 71 IN | WEIGHT: 201.6 LBS | DIASTOLIC BLOOD PRESSURE: 70 MMHG | TEMPERATURE: 97.6 F

## 2024-11-18 DIAGNOSIS — Z13.6 ENCOUNTER FOR LIPID SCREENING FOR CARDIOVASCULAR DISEASE: ICD-10-CM

## 2024-11-18 DIAGNOSIS — E55.9 VITAMIN D INSUFFICIENCY: ICD-10-CM

## 2024-11-18 DIAGNOSIS — Z13.220 ENCOUNTER FOR LIPID SCREENING FOR CARDIOVASCULAR DISEASE: ICD-10-CM

## 2024-11-18 DIAGNOSIS — E78.2 MIXED HYPERLIPIDEMIA: Primary | ICD-10-CM

## 2024-11-18 DIAGNOSIS — N43.3 LEFT HYDROCELE: ICD-10-CM

## 2024-11-18 PROCEDURE — 99214 OFFICE O/P EST MOD 30 MIN: CPT | Performed by: FAMILY MEDICINE

## 2024-11-18 RX ORDER — CHOLECALCIFEROL (VITAMIN D3) 1250 MCG
CAPSULE ORAL
Qty: 12 CAPSULE | Refills: 0 | Status: CANCELLED | OUTPATIENT
Start: 2024-11-18

## 2024-11-18 NOTE — ASSESSMENT & PLAN NOTE
Reviewed & discussed labs including lipid panel   11/14/2024 Triglycerides/HDL ratio: 2.66. Goal <2.  Currently not prescribed any meds.   Reports:  Poor diet recently  Assessment: HDL 53, triglyceride 141-increased from previous 69,  slightly improving from previous 142.  Remaining overall uncontrolled.  Med changes: None. Continue to focus on lifestyle improvement only.    Advised improving nutritional intake and exercising regularly, really focusing on building good habits as discussed.  Goal of <60 Apolipoprotein B & LDL, <100 triglycerides, and >60 HDL  Monitor lipid panel 6 months  Orders:    Apolipoprotein B; Future    TSH, 3rd generation; Future    Lipoprotein A (LPA); Future

## 2024-11-18 NOTE — PROGRESS NOTES
[place] PRIMARY CARE  Ambulatory Office Visit     PATIENT INFORMATION   Name: Tae Roblero   YOB: 1980   MRN: [id number]  Encounter Provider: Sunday Connolly MD    Encounter Date: 11/18/2024    ASSESSMENT & PLAN     Assessment & Plan  Mixed hyperlipidemia  Reviewed & discussed labs including lipid panel   11/14/2024 Triglycerides/HDL ratio: 2.66. Goal <2.  Currently not prescribed any meds.   Reports:   Poor diet recently  Assessment: HDL 53, triglyceride 141-increased from previous 69,  slightly improving from previous 142.  Remaining overall uncontrolled.  Med changes: None. Continue to focus on lifestyle improvement only.    Advised improving nutritional intake and exercising regularly, really focusing on building good habits as discussed.  Goal of <60 Apolipoprotein B & LDL, <100 triglycerides, and >60 HDL  Monitor lipid panel 6 months  Orders:    Apolipoprotein B; Future    TSH, 3rd generation; Future    Lipoprotein A (LPA); Future    Vitamin D insufficiency    Orders:    Vitamin D 25 hydroxy; Future    Encounter for lipid screening for cardiovascular disease    Orders:    Lipid Panel with Direct LDL reflex; Future    BMI 28.0-28.9,adult    Orders:    Comprehensive metabolic panel; Future    CBC and differential; Future    Left hydrocele  Patient seen by urology through [facility] found to have hydrocele bilaterally right being larger than left based on ultrasound reports.  Patient reports left side is more prominent and uncomfortable compared to right side.  Would like a second opinion.  Referral in place  Orders:    Ambulatory referral to Urology; Future    US scrotum and testicles; Future         HEALTH MAINTENANCE     Immunization History   Administered Date(s) Administered    COVID-19 MODERNA VACC 0.5 ML IM 01/29/2021, 03/03/2021, 11/15/2021    Influenza, injectable, quadrivalent, preservative free 0.5 mL 11/20/2019, 11/10/2020, 11/22/2021, 11/29/2022    Tdap 11/19/2020     Tuberculin Skin Test-PPD Intradermal 04/11/2023     Colonoscopy: Not on file Not on file  Cologuard: Not on file   CT lung:    Prostate:   Lab Results   Component Value Date    PSA 0.42 05/02/2024           FOLLOW UP   Return in about 6 months (around 5/18/2025) for Annual physical.    CURRENT MEDICATIONS     Current Outpatient Medications:     fluticasone (FLONASE) 50 mcg/act nasal spray, SPRAY 1 SPRAY INTO EACH NOSTRIL EVERY DAY, Disp: 48 mL, Rfl: 1    multivitamin (THERAGRAN) TABS, Take 1 tablet by mouth daily, Disp: , Rfl:     Cholecalciferol (Vitamin D3) 1.25 MG (24662 UT) CAPS, 2 times a week for 12 weeks. (Patient not taking: Reported on 11/18/2024), Disp: 24 capsule, Rfl: 0      CHIEF COMPLIANT     Chief Complaint   Patient presents with    Follow-up     Six month,review labs          HISTORY OF PRESENT ILLNESS    History of Present Illness     History obtained from : patient  HPI  Review of Systems    PAST MEDICAL & SURGICAL HISTORY     Past Medical History:   Diagnosis Date    Allergic 01/01/1999    Cats    Low back pain     Lumbar disc herniation 11/19/2019    Added automatically from request for surgery [id number]    Lumbar radiculopathy 12/04/2018    Lumbar spondylosis 02/15/2019    Lumbosacral disc disease     Night terrors, adult     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    [place] VAZQUEZ FACETECTOMY & FORAMOTOMY 1 VRT [facility] LUMBAR Left 03/12/2019    Procedure: L5-S1 MINIMALLY INVASIVE LAMINAL FORAMINOTOMY AND POSSIBLE MICRODISCECTOMY WITH POSSIBLE EPIDURAL STEROID INJECTION;  Surgeon: Franklin Tijerina MD;  [facility];  Service: Neurosurgery    [place] LAMOT PRTL FFD EXC DISC REEXPL 1 [facility] LUMBAR Left 12/11/2019    Procedure: reopening of lumbar incision for minimally invasive left L5-S1 microdiskectomy and  "epidural steroid injection;  Surgeon: Franklin Tijerina MD;  [facility];  Service: Neurosurgery    SPINE SURGERY  3 disc ectomies       OBJECTIVES      /70 (BP Location: Left arm, Patient Position: Sitting, Cuff Size: Large)   Pulse 88   Temp 97.6 °F (36.4 °C) (Tympanic)   Resp 14   Ht 5' 10.5\" (1.791 m)   Wt 91.4 kg (201 lb 9.6 oz)   SpO2 97%   BMI 28.52 kg/m²    Physical Exam  Vitals reviewed.   Constitutional:       General: He is not in acute distress.     Appearance: Normal appearance. He is not ill-appearing, toxic-appearing or diaphoretic.   HENT:      Head: Normocephalic and atraumatic.      Right Ear: External ear normal.      Left Ear: External ear normal.      Nose: No congestion or rhinorrhea.   Eyes:      General: No scleral icterus.        Right eye: No discharge.         Left eye: No discharge.      Conjunctiva/sclera: Conjunctivae normal.   Cardiovascular:      Rate and Rhythm: Normal rate.   Pulmonary:      Effort: Pulmonary effort is normal. No respiratory distress.   Neurological:      General: No focal deficit present.      Mental Status: He is alert and oriented to person, place, and time.   Psychiatric:         Mood and Affect: Mood normal.         Behavior: Behavior normal.         Thought Content: Thought content normal.           Sunday Connolly MD  Family Medicine Physician   [facility] CARE [place]      Administrative Statements     Medications have been reviewed by provider in current encounter  "

## 2024-12-02 ENCOUNTER — OFFICE VISIT (OUTPATIENT)
Age: 44
End: 2024-12-02
Payer: COMMERCIAL

## 2024-12-02 VITALS
WEIGHT: 204 LBS | SYSTOLIC BLOOD PRESSURE: 136 MMHG | BODY MASS INDEX: 28.56 KG/M2 | TEMPERATURE: 97.6 F | OXYGEN SATURATION: 96 % | DIASTOLIC BLOOD PRESSURE: 89 MMHG | HEIGHT: 71 IN | HEART RATE: 87 BPM | RESPIRATION RATE: 14 BRPM

## 2024-12-02 DIAGNOSIS — Z12.12 SCREENING FOR COLORECTAL CANCER: ICD-10-CM

## 2024-12-02 DIAGNOSIS — Z12.11 SCREENING FOR COLORECTAL CANCER: ICD-10-CM

## 2024-12-02 DIAGNOSIS — M25.511 ACUTE PAIN OF RIGHT SHOULDER: Primary | ICD-10-CM

## 2024-12-02 DIAGNOSIS — K21.9 GASTROESOPHAGEAL REFLUX DISEASE, UNSPECIFIED WHETHER ESOPHAGITIS PRESENT: ICD-10-CM

## 2024-12-02 PROCEDURE — 99214 OFFICE O/P EST MOD 30 MIN: CPT | Performed by: FAMILY MEDICINE

## 2024-12-02 RX ORDER — METHOCARBAMOL 500 MG/1
500 TABLET, FILM COATED ORAL 4 TIMES DAILY
Qty: 40 TABLET | Refills: 0 | Status: SHIPPED | OUTPATIENT
Start: 2024-12-02

## 2024-12-02 NOTE — PROGRESS NOTES
[place] [facility]  Ambulatory Office Visit     PATIENT INFORMATION   Name: Tae Roblero   YOB: 1980   MRN: [id number]  Encounter Provider: Sunday Connolly MD    Encounter Date: 12/2/2024    ASSESSMENT & PLAN     Assessment & Plan  Acute pain of right shoulder  Patient most likely pulled a muscle, provided muscle relaxer.  Orders:    methocarbamol (ROBAXIN) 500 mg tablet; Take 1 tablet (500 mg total) by mouth 4 (four) times a day    Screening for colorectal cancer    Orders:    Ambulatory referral to Gastroenterology; Future    Gastroesophageal reflux disease, unspecified whether esophagitis present  Started omeprazole 20 mg over-the-counter a few days ago since start of symptoms.  Continue you omeprazole and monitor symptoms.  If worsening come in sooner  Referral in place for GI, will need colonoscopy end of February would recommend doing an endoscopy at that time.  If symptoms are worsening may need to get EGD sooner. For now continue omeprazole and monitor symptoms as discussed            FOLLOW UP   Return if symptoms worsen or fail to improve.    CURRENT MEDICATIONS     Current Outpatient Medications:     Cholecalciferol (Vitamin D3) 1.25 MG (91058 UT) CAPS, 2 times a week for 12 weeks., Disp: 24 capsule, Rfl: 0    fluticasone (FLONASE) 50 mcg/act nasal spray, SPRAY 1 SPRAY INTO EACH NOSTRIL EVERY DAY, Disp: 48 mL, Rfl: 1    methocarbamol (ROBAXIN) 500 mg tablet, Take 1 tablet (500 mg total) by mouth 4 (four) times a day, Disp: 40 tablet, Rfl: 0    multivitamin (THERAGRAN) TABS, Take 1 tablet by mouth daily, Disp: , Rfl:       CHIEF COMPLIANT     Chief Complaint   Patient presents with    Other     Right side of chest to back of shoulder and swallowing in painful.  Onset Tuesday.Pulled shoulder Wednesday.        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    [place] VAZQUEZ FACETECTOMY & FORAMOTOMY 1 VRT [facility] LUMBAR Left 03/12/2019    Procedure: L5-S1 MINIMALLY INVASIVE LAMINAL FORAMINOTOMY AND POSSIBLE MICRODISCECTOMY WITH POSSIBLE EPIDURAL STEROID INJECTION;  Surgeon: Franklin Tijerina MD;  [facility];  Service: Neurosurgery    [place] LAMOT PRTL FFD EXC DISC REEXPL 1 [facility] LUMBAR Left 12/11/2019    Procedure: reopening of lumbar incision for minimally invasive left L5-S1 microdiskectomy and epidural steroid injection;  Surgeon: Franklin Tijerina MD;  [facility];  Service: Neurosurgery    SPINE SURGERY  3 disc ectomies       OBJECTIVES      /89 (BP Location: Left arm, Patient Position: Sitting, Cuff Size: Standard)   Pulse 87   Temp 97.6 °F (36.4 °C) (Tympanic)   Resp 14   Ht 5' 10.5\" (1.791 m)   Wt 92.5 kg (204 lb)   SpO2 96%   BMI 28.86 kg/m²    Physical Exam  Vitals reviewed.   Constitutional:       General: He is not in acute distress.     Appearance: Normal appearance. He is not ill-appearing, toxic-appearing or diaphoretic.   HENT:      Head: Normocephalic and atraumatic.      Right Ear: External ear normal.      Left Ear: External ear normal.      Nose: No congestion or rhinorrhea.   Eyes:      General: No scleral icterus.        Right eye: No discharge.         Left eye: No discharge.      Conjunctiva/sclera: Conjunctivae normal.   Cardiovascular:      Rate and Rhythm: Normal rate.   Pulmonary:      Effort: Pulmonary effort " is normal. No respiratory distress.   Neurological:      General: No focal deficit present.      Mental Status: He is alert and oriented to person, place, and time.   Psychiatric:         Mood and Affect: Mood normal.         Behavior: Behavior normal.         Thought Content: Thought content normal.           Sunday Connolly MD  Family Medicine Physician   [facility] [place]      Administrative Statements     Medications have been reviewed by provider in current encounter

## 2024-12-04 ENCOUNTER — TELEPHONE (OUTPATIENT)
Age: 44
End: 2024-12-04

## 2024-12-04 ENCOUNTER — HOSPITAL ENCOUNTER (OUTPATIENT)
Dept: ULTRASOUND IMAGING | Facility: CLINIC | Age: 44
Discharge: HOME | End: 2024-12-04
Payer: COMMERCIAL

## 2024-12-04 DIAGNOSIS — N43.3 LEFT HYDROCELE: ICD-10-CM

## 2024-12-04 PROCEDURE — 76870 US EXAM SCROTUM: CPT

## 2024-12-04 NOTE — TELEPHONE ENCOUNTER
New Patient    What is the reason for the patient’s appointment? NP- second opinion- left hydrocele had u/s done today. Gave patient first available. Please triage for appropriate timeframe.    What office location does the patient prefer? [place]    Does patient have Imaging/Lab Results:  In Epic     Have patient records been requested?  If No, are the records showing in Epic:   In Epic     INSURANCE:   Do we accept the patient's insurance or is the patient Self-Pay?  [employer]    HISTORY:   Has the patient had any previous Urologist(s)? No     Was the patient seen in the ED? No     Has the patient had any outside testing done? No     Does the patient have a personal history of cancer? No

## 2024-12-10 ENCOUNTER — RESULTS FOLLOW-UP (OUTPATIENT)
Age: 44
End: 2024-12-10

## 2024-12-10 DIAGNOSIS — I86.1 BILATERAL VARICOCELES: Primary | ICD-10-CM

## 2024-12-10 NOTE — TELEPHONE ENCOUNTER
----- Message from Sunday Connolly MD sent at 12/10/2024  3:55 PM EST -----  Please call patient to report on recent imaging results. Reviewed results of scrotum ultrasound.  It appears that you have bilateral varicoceles but no testicular mass noted.  At this time I would like to do further imaging to evaluate cause of the varicoceles.  Please call urology to schedule an appointment with the specialist in the meantime.  I have already placed the referral previously -- you can call their office [phone].  Please call central scheduling to schedule the MRI of abdomen and pelvis, its is placed for urgent to be done as soon as possible.  And please schedule a follow-up visit with me to discuss everything in 4 weeks.

## 2024-12-11 NOTE — TELEPHONE ENCOUNTER
----- Message from Sunday Connolly MD sent at 12/10/2024  3:55 PM EST -----  Please call patient to report on recent imaging results. Reviewed results of scrotum ultrasound.  It appears that you have bilateral varicoceles but no testicular mass noted.  At this time I would like to do further imaging to evaluate cause of the varicoceles.  Please call urology to schedule an appointment with the specialist in the meantime.  I have already placed the referral previously -- you can call their office [phone].  Please call central scheduling to schedule the MRI of abdomen and pelvis, its is placed for urgent to be done as soon as possible.  And please schedule a follow-up visit with me to discuss everything in 4 weeks.

## 2024-12-11 NOTE — TELEPHONE ENCOUNTER
Spoke with: patient  Re:  Imaging results/order/referral     Provider's message relayed in full detail.  Comments:

## 2024-12-23 ENCOUNTER — OFFICE VISIT (OUTPATIENT)
Age: 44
End: 2024-12-23
Payer: COMMERCIAL

## 2024-12-23 VITALS
RESPIRATION RATE: 14 BRPM | SYSTOLIC BLOOD PRESSURE: 130 MMHG | TEMPERATURE: 97.6 F | HEIGHT: 71 IN | DIASTOLIC BLOOD PRESSURE: 78 MMHG | HEART RATE: 96 BPM | WEIGHT: 204 LBS | BODY MASS INDEX: 28.56 KG/M2 | OXYGEN SATURATION: 98 %

## 2024-12-23 DIAGNOSIS — I86.1 BILATERAL VARICOCELES: ICD-10-CM

## 2024-12-23 DIAGNOSIS — R05.3 PERSISTENT COUGH: Primary | ICD-10-CM

## 2024-12-23 PROCEDURE — 99214 OFFICE O/P EST MOD 30 MIN: CPT | Performed by: FAMILY MEDICINE

## 2024-12-23 RX ORDER — BENZONATATE 100 MG/1
100 CAPSULE ORAL 3 TIMES DAILY PRN
Qty: 20 CAPSULE | Refills: 0 | Status: SHIPPED | OUTPATIENT
Start: 2024-12-23

## 2024-12-23 RX ORDER — ALBUTEROL SULFATE 90 UG/1
2 INHALANT RESPIRATORY (INHALATION) EVERY 6 HOURS PRN
Qty: 18 G | Refills: 5 | Status: SHIPPED | OUTPATIENT
Start: 2024-12-23

## 2024-12-23 NOTE — ASSESSMENT & PLAN NOTE
Previously reviewed results of ultrasound of scrotum showing bilateral varicocele.  MRI was ordered to further evaluate with recommendation of following up with urology.  Patient has MRI scheduled for 1/7 and urology 1/13.

## 2024-12-23 NOTE — PROGRESS NOTES
[facility]  Ambulatory Office Visit     PATIENT INFORMATION   Name: Tae Roblero   YOB: 1980   MRN: [id number]  Encounter Provider: Sunday Connolly MD    Encounter Date: 12/23/2024    ASSESSMENT & PLAN     Assessment & Plan  Persistent cough  Currently experiencing only symptoms of cough but has noted sputum production at nighttime.  No other symptoms including fevers and sweats.  Discussed cough may linger on.  At this time would recommend better management of the cough with Tessalon Perles and albuterol with history of left induced asthma.  Recommended chest x-ray if worsening of sputum production in the next couple days.    Orders:  •  XR chest pa and lateral; Future  •  benzonatate (TESSALON PERLES) 100 mg capsule; Take 1 capsule (100 mg total) by mouth 3 (three) times a day as needed for cough  •  albuterol (Ventolin HFA) 90 mcg/act inhaler; Inhale 2 puffs every 6 (six) hours as needed for wheezing    Bilateral varicoceles  Previously reviewed results of ultrasound of scrotum showing bilateral varicocele.  MRI was ordered to further evaluate with recommendation of following up with urology.  Patient has MRI scheduled for 1/7 and urology 1/13.            FOLLOW UP   No follow-ups on file.    CURRENT MEDICATIONS     Current Outpatient Medications:   •  albuterol (Ventolin HFA) 90 mcg/act inhaler, Inhale 2 puffs every 6 (six) hours as needed for wheezing, Disp: 18 g, Rfl: 5  •  benzonatate (TESSALON PERLES) 100 mg capsule, Take 1 capsule (100 mg total) by mouth 3 (three) times a day as needed for cough, Disp: 20 capsule, Rfl: 0  •  Cholecalciferol (Vitamin D3) 1.25 MG (89895 UT) CAPS, 2 times a week for 12 weeks., Disp: 24 capsule, Rfl: 0  •  fluticasone (FLONASE) 50 mcg/act nasal spray, SPRAY 1 SPRAY INTO EACH NOSTRIL EVERY DAY, Disp: 48 mL, Rfl: 1  •  multivitamin (THERAGRAN) TABS, Take 1 tablet by mouth daily, Disp: , Rfl:       CHIEF COMPLIANT     Chief Complaint   Patient  presents with   • Cold Like Symptoms   • Cough     X 1 week mucus, dark green mucus  Covid test negative        HISTORY OF PRESENT ILLNESS    History of Present Illness     History obtained from : patient  Cough  This is a new problem. The current episode started in the past 7 days. The problem has been waxing and waning. The problem occurs constantly. The cough is Productive of brown sputum. Associated symptoms include chest pain, shortness of breath, sweats and wheezing. Pertinent negatives include no chills, ear congestion, ear pain, fever, headaches, heartburn, hemoptysis, myalgias, nasal congestion, postnasal drip, rash, rhinorrhea or weight loss. The symptoms are aggravated by lying down.     Review of Systems   Constitutional:  Negative for chills, fever and weight loss.   HENT:  Negative for ear pain, postnasal drip and rhinorrhea.    Respiratory:  Positive for cough, shortness of breath and wheezing. Negative for hemoptysis.    Cardiovascular:  Positive for chest pain.   Gastrointestinal:  Negative for heartburn.   Musculoskeletal:  Negative for myalgias.   Skin:  Negative for rash.   Neurological:  Negative for headaches.       PAST MEDICAL & SURGICAL HISTORY     Past Medical History:   Diagnosis Date   • Allergic 01/01/1999    Cats   • Low back pain    • Lumbar disc herniation 11/19/2019    Added automatically from request for surgery [id number]   • Lumbar radiculopathy 12/04/2018   • Lumbar spondylosis 02/15/2019   • Lumbosacral disc disease    • Night terrors, adult    • Recurrent displacement of lumbar disc 11/15/2019   • Shingles 02/29/2004   • Status post lumbar discectomy 11/06/2018     Past Surgical History:   Procedure Laterality Date   • LUMBAR SPINE SURGERY  10/07/2015    Dr. [facility]   • [place] VAZQUEZ FACETECTOMY & FORAMOTOMY 1 VRT SGM LUMBAR Left 03/12/2019    Procedure: L5-S1 MINIMALLY INVASIVE LAMINAL FORAMINOTOMY AND POSSIBLE MICRODISCECTOMY WITH POSSIBLE EPIDURAL STEROID INJECTION;   "Surgeon: Franklin Tijerina MD;  [facility];  Service: Neurosurgery   • [place] LAMOT PRTL FFD EXC DISC REEXPL 1 NTRSPC LUMBAR Left 12/11/2019    Procedure: reopening of lumbar incision for minimally invasive left L5-S1 microdiskectomy and epidural steroid injection;  Surgeon: Franklin Tijerina MD;  [facility];  Service: Neurosurgery   • SPINE SURGERY  3 disc ectomies       OBJECTIVES      /78 (BP Location: Left arm, Patient Position: Sitting, Cuff Size: Large)   Pulse 96   Temp 97.6 °F (36.4 °C) (Tympanic)   Resp 14   Ht 5' 10.5\" (1.791 m)   Wt 92.5 kg (204 lb)   SpO2 98%   BMI 28.86 kg/m²    Physical Exam  Vitals reviewed.   Constitutional:       General: He is not in acute distress.     Appearance: Normal appearance. He is not ill-appearing, toxic-appearing or diaphoretic.   HENT:      Head: Normocephalic and atraumatic.      Right Ear: Tympanic membrane and external ear normal.      Left Ear: Tympanic membrane and external ear normal.      Nose: No congestion or rhinorrhea.      Mouth/Throat:      Pharynx: Posterior oropharyngeal erythema present. No oropharyngeal exudate.   Eyes:      General: No scleral icterus.        Right eye: No discharge.         Left eye: No discharge.      Conjunctiva/sclera: Conjunctivae normal.   Cardiovascular:      Rate and Rhythm: Normal rate and regular rhythm.      Pulses: Normal pulses.      Heart sounds: Normal heart sounds.   Pulmonary:      Effort: Pulmonary effort is normal. No respiratory distress.      Breath sounds: No stridor. No wheezing, rhonchi or rales.   Neurological:      General: No focal deficit present.      Mental Status: He is alert and oriented to person, place, and time.   Psychiatric:         Mood and Affect: Mood normal.         Behavior: Behavior normal.         Thought Content: Thought content normal.           Sunday Connolly MD  Family Medicine Physician   [facility] [place]      Administrative Statements "     Medications have been reviewed by provider in current encounter

## 2024-12-26 ENCOUNTER — RESULTS FOLLOW-UP (OUTPATIENT)
Age: 44
End: 2024-12-26

## 2024-12-26 ENCOUNTER — APPOINTMENT (OUTPATIENT)
Age: 44
End: 2024-12-26
Payer: COMMERCIAL

## 2024-12-26 DIAGNOSIS — R05.3 PERSISTENT COUGH: ICD-10-CM

## 2024-12-26 PROCEDURE — 71046 X-RAY EXAM CHEST 2 VIEWS: CPT

## 2025-01-07 ENCOUNTER — HOSPITAL ENCOUNTER (OUTPATIENT)
Dept: MRI IMAGING | Facility: HOSPITAL | Age: 45
Discharge: HOME | End: 2025-01-07
Attending: FAMILY MEDICINE
Payer: COMMERCIAL

## 2025-01-07 DIAGNOSIS — I86.1 BILATERAL VARICOCELES: ICD-10-CM

## 2025-01-07 PROCEDURE — 72197 MRI PELVIS W/O & W/DYE: CPT

## 2025-01-07 PROCEDURE — 74183 MRI ABD W/O CNTR FLWD CNTR: CPT

## 2025-01-07 PROCEDURE — A9585 GADOBUTROL INJECTION: HCPCS | Performed by: FAMILY MEDICINE

## 2025-01-07 RX ORDER — GADOBUTROL 604.72 MG/ML
9 INJECTION INTRAVENOUS
Status: COMPLETED | OUTPATIENT
Start: 2025-01-07 | End: 2025-01-07

## 2025-01-07 RX ADMIN — GADOBUTROL 9 ML: 604.72 INJECTION INTRAVENOUS at 22:33

## 2025-01-10 NOTE — ASSESSMENT & PLAN NOTE
- US scrotum/testicles from 12/4/24 - Bilateral varicoceles. No intratesticular mass. No hydroceles noted  - MRI abdomen and pelvis from 1/7/25 - No intra-abdominal mass. No retroperitoneal adenopathy. Small bilateral scrotal hydroceles. Varicoceles bilateral varicoceles are better characterized on the ultrasound report.   - Exam today overall unremarkable, no palpable hydrocele. Small bilateral varicoceles if anything. No intratesticular masses  - Reviewed benign imaging findings with patient. Advised no treatment is necessary. Should he start to have bothersome testicular discomfort, discussed potential candidacy for varicocelectomy.   - Can f/u PRN

## 2025-01-10 NOTE — PROGRESS NOTES
Name: Tae Roblero      : 1980      MRN: [id number]  Encounter Provider: Asya Lujan PA-C  Encounter Date: 2025   Encounter department: [facility] [place]  :  Assessment & Plan  Bilateral varicoceles  - US scrotum/testicles from 24 - Bilateral varicoceles. No intratesticular mass. No hydroceles noted  - MRI abdomen and pelvis from 25 - No intra-abdominal mass. No retroperitoneal adenopathy. Small bilateral scrotal hydroceles. Varicoceles bilateral varicoceles are better characterized on the ultrasound report.   - Exam today overall unremarkable, no palpable hydrocele. Small bilateral varicoceles if anything. No intratesticular masses  - Reviewed benign imaging findings with patient. Advised no treatment is necessary. Should he start to have bothersome testicular discomfort, discussed potential candidacy for varicocelectomy.   - Can f/u PRN           History of Present Illness   Tae Roblero is a 44 y.o. male who presents for new patient evaluation of hydrocele and varicoceles. He previously saw [facility] urology for testicular pain and was noted to bilateral hydrocele larger on the right on US in May 2024. He underwent repeat US last month which did not show hydroceles and showed bilateral varicoceles. An MRI of the abdomen and pelvis was ordered through PCP to rule out any abdominal masses which is negative. He reports two episodes of left testicular pain and swelling. Denies any inciting or precipitating factors. Currently is asymptomatic. Prior  surgical manipulation includes vasectomy. He denies any family history of  malignancy.     Review of Systems   Constitutional:  Negative for chills and fever.   Respiratory:  Negative for shortness of breath.    Cardiovascular:  Negative for chest pain.   Gastrointestinal:  Negative for abdominal pain.   Genitourinary:  Negative for difficulty urinating, dysuria, flank pain, frequency, hematuria and urgency.    Neurological:  Negative for dizziness.        Objective   There were no vitals taken for this visit.    Physical Exam  Constitutional:       Appearance: Normal appearance.   HENT:      Head: Normocephalic and atraumatic.      Right Ear: External ear normal.      Left Ear: External ear normal.      Nose: Nose normal.   Eyes:      General: No scleral icterus.     Conjunctiva/sclera: Conjunctivae normal.   Cardiovascular:      Pulses: Normal pulses.   Pulmonary:      Effort: Pulmonary effort is normal.   Genitourinary:     Comments: No significant hydroceles. Possible small bilateral varicoceles. No intratesticular masses. No testicular atrophy. Testes symmetric and descended bilaterally.   Musculoskeletal:         General: Normal range of motion.      Cervical back: Normal range of motion.   Skin:     General: Skin is warm and dry.   Neurological:      General: No focal deficit present.      Mental Status: He is alert and oriented to person, place, and time.   Psychiatric:         Mood and Affect: Mood normal.         Behavior: Behavior normal.         Thought Content: Thought content normal.         Judgment: Judgment normal.          Results  Lab Results   Component Value Date    PSA 0.42 05/02/2024     Lab Results   Component Value Date    CALCIUM 8.8 11/14/2024    K 4.4 11/14/2024    CO2 30 11/14/2024     11/14/2024    BUN 20 11/14/2024    CREATININE 0.92 11/14/2024     Lab Results   Component Value Date    WBC 5.36 05/02/2024    HGB 15.7 05/02/2024    HCT 47.4 05/02/2024    MCV 94 05/02/2024     05/02/2024       Office Urine Dip  No results found for this or any previous visit (from the past hour).]

## 2025-01-13 ENCOUNTER — RESULTS FOLLOW-UP (OUTPATIENT)
Age: 45
End: 2025-01-13

## 2025-01-13 ENCOUNTER — OFFICE VISIT (OUTPATIENT)
Dept: UROLOGY | Facility: CLINIC | Age: 45
End: 2025-01-13
Payer: COMMERCIAL

## 2025-01-13 VITALS
HEIGHT: 71 IN | WEIGHT: 200 LBS | HEART RATE: 74 BPM | DIASTOLIC BLOOD PRESSURE: 78 MMHG | SYSTOLIC BLOOD PRESSURE: 120 MMHG | TEMPERATURE: 98.2 F | OXYGEN SATURATION: 98 % | BODY MASS INDEX: 28 KG/M2

## 2025-01-13 DIAGNOSIS — I86.1 BILATERAL VARICOCELES: Primary | ICD-10-CM

## 2025-01-13 PROCEDURE — 99203 OFFICE O/P NEW LOW 30 MIN: CPT | Performed by: PHYSICIAN ASSISTANT

## 2025-03-20 NOTE — TELEPHONE ENCOUNTER
Pollyann Hamman md aware  He should give injection some more time to take effect  It may take up to 2 weeks   Keep f/u appt V[facility]. PT maintaining O2 >75% on 2.5L NC. Pt had one episode of small emesis at 12 o'clock feed after working with OT, all feeds ran over 90 minutes. Medications given thru [facility] g-tube. Pt had multiple wet diapers and a BM. POC reviewed with dad at bedside, verbalized understanding. Safety maintained.

## 2025-05-14 ENCOUNTER — OFFICE VISIT (OUTPATIENT)
Age: 45
End: 2025-05-14
Payer: COMMERCIAL

## 2025-05-14 VITALS
WEIGHT: 199 LBS | OXYGEN SATURATION: 98 % | DIASTOLIC BLOOD PRESSURE: 80 MMHG | SYSTOLIC BLOOD PRESSURE: 120 MMHG | BODY MASS INDEX: 27.86 KG/M2 | HEIGHT: 71 IN | HEART RATE: 67 BPM

## 2025-05-14 DIAGNOSIS — Z12.11 SCREENING FOR COLON CANCER: Primary | ICD-10-CM

## 2025-05-14 PROCEDURE — 99203 OFFICE O/P NEW LOW 30 MIN: CPT | Performed by: PHYSICIAN ASSISTANT

## 2025-05-14 RX ORDER — SODIUM CHLORIDE, SODIUM LACTATE, POTASSIUM CHLORIDE, CALCIUM CHLORIDE 600; 310; 30; 20 MG/100ML; MG/100ML; MG/100ML; MG/100ML
125 INJECTION, SOLUTION INTRAVENOUS CONTINUOUS
OUTPATIENT
Start: 2025-05-14

## 2025-05-14 RX ORDER — TRIAMCINOLONE ACETONIDE 1 MG/G
CREAM TOPICAL
COMMUNITY
Start: 2025-03-12

## 2025-05-14 RX ORDER — PERMETHRIN 50 MG/G
CREAM TOPICAL
COMMUNITY
Start: 2025-03-12

## 2025-05-14 NOTE — LETTER
May 23, 2025     Sunday Connolly MD  [address] [facility] [address]    Patient: Tae Roblero   YOB: 1980   Date of Visit: 2025       Dear Dr. Sunday Connolly MD:    Thank you for referring Tae Roblero to me for evaluation. Below are my notes for this consultation.    If you have questions, please do not hesitate to call me. I look forward to following your patient along with you.         Sincerely,        Subha Clay PA-C        CC: No Recipients    Subha Clay PA-C  2025  8:56 AM  Signed  Name: Tae Roblero      : 1980      MRN: [id number]  Encounter Provider: Subha Clay PA-C  Encounter Date: 2025   Encounter department: [facility] [place]  :  Assessment & Plan  Screening for colon cancer    Patient presents to schedule a screening colonoscopy (first one). No family history of colon cancer.    Will plan for colonoscopy to investigate.    Orders:  •  Colonoscopy; Future        History of Present Illness  HPI  Tae Roblero is a 45 y.o. male who presents to the office to schedule a screening colonoscopy (first one).  No family history of colon cancer.  No rectal bleeding, changes in his bowel movements, or abdominal pain.  He has undergone anesthesia in the past for his multiple back surgeries and reports he did well with anesthesia in the past.    I discussed informed consent with the patient for the colonoscopy. The risks/benefits of the procedure were discussed with the patient. Risks included, but not limited to, infection, bleeding, perforation were discussed. Patient was agreeable.   History obtained from: patient    Review of Systems   Constitutional: Negative.    HENT: Negative.     Eyes: Negative.    Respiratory: Negative.     Cardiovascular: Negative.    Gastrointestinal: Negative.    Endocrine: Negative.    Genitourinary: Negative.    Musculoskeletal: Negative.    Skin:  Negative.    Neurological: Negative.    Hematological: Negative.    Psychiatric/Behavioral: Negative.       Medical History Reviewed by provider this encounter:  Meds     .  Past Medical History  Past Medical History:   Diagnosis Date   • Allergic 01/01/1999    Cats   • Low back pain    • Lumbar disc herniation 11/19/2019    Added automatically from request for surgery [id number]   • Lumbar radiculopathy 12/04/2018   • Lumbar spondylosis 02/15/2019   • Lumbosacral disc disease    • Night terrors, adult    • Recurrent displacement of lumbar disc 11/15/2019   • Shingles 02/29/2004   • Status post lumbar discectomy 11/06/2018     Past Surgical History:   Procedure Laterality Date   • LUMBAR SPINE SURGERY  10/07/2015    Dr. [facility]   • [place] VAZQUEZ FACETECTOMY & FORAMOTOMY 1 VRT [facility] LUMBAR Left 03/12/2019    Procedure: L5-S1 MINIMALLY INVASIVE LAMINAL FORAMINOTOMY AND POSSIBLE MICRODISCECTOMY WITH POSSIBLE EPIDURAL STEROID INJECTION;  Surgeon: Franklin Tijerina MD;  [facility];  Service: Neurosurgery   • [place] [facility]    Procedure: reopening of lumbar incision for minimally invasive left L5-S1 microdiskectomy and epidural steroid injection;  Surgeon: Franklin Tijerina MD;  [facility];  Service: Neurosurgery   • SPINE SURGERY  3 disc ectomies     Family History   Problem Relation Age of Onset   • Lumbar disc disease Father    • Asthma Father    • Cancer Mother       reports that he has never smoked. He has never been exposed to tobacco smoke. He has never used smokeless tobacco. He reports current alcohol use of about 8.0 standard drinks of alcohol per week. He reports that he does not use drugs.  Current Outpatient Medications   Medication Instructions   • Cholecalciferol (Vitamin D3) 1.25 MG (92159 UT) CAPS 2 times a week for 12 weeks.   • fluticasone (FLONASE) 50 mcg/act nasal spray SPRAY 1 SPRAY INTO EACH NOSTRIL EVERY DAY   • multivitamin  "(THERAGRAN) TABS 1 tablet, Daily   • permethrin (ELIMITE) 5 % cream APPLY FROM HEAD TO TOE, LEAVE ON 8-14 HOURS, THEN RINSE OFF. CAN REPEAT IN 1 WEEK IF NEEDED.   • triamcinolone (KENALOG) 0.1 % cream Topical   Allergies[1]   Medications Ordered Prior to Encounter[2]   Social History     Tobacco Use   • Smoking status: Never     Passive exposure: Never   • Smokeless tobacco: Never   Vaping Use   • Vaping status: Never Used   Substance and Sexual Activity   • Alcohol use: Yes     Alcohol/week: 8.0 standard drinks of alcohol     Types: 4 Glasses of wine, 4 Cans of beer per week   • Drug use: No   • Sexual activity: Yes     Partners: Female     Birth control/protection: Male Sterilization        Objective  /80   Pulse 67   Ht 5' 10.5\" (1.791 m)   Wt 90.3 kg (199 lb)   SpO2 98%   BMI 28.15 kg/m²      Physical Exam  Constitutional:       General: He is not in acute distress.     Appearance: Normal appearance. He is not ill-appearing.   HENT:      Head: Normocephalic and atraumatic.     Cardiovascular:      Rate and Rhythm: Normal rate and regular rhythm.   Pulmonary:      Effort: Pulmonary effort is normal.      Breath sounds: Normal breath sounds.     Skin:     General: Skin is warm and dry.     Neurological:      Mental Status: He is alert and oriented to person, place, and time.     Psychiatric:         Mood and Affect: Mood normal.         Behavior: Behavior normal.                [1] No Known Allergies  [2]   Current Outpatient Medications on File Prior to Visit   Medication Sig Dispense Refill   • Cholecalciferol (Vitamin D3) 1.25 MG (58144 UT) CAPS 2 times a week for 12 weeks. 24 capsule 0   • fluticasone (FLONASE) 50 mcg/act nasal spray SPRAY 1 SPRAY INTO EACH NOSTRIL EVERY DAY 48 mL 1   • multivitamin (THERAGRAN) TABS Take 1 tablet by mouth in the morning.     • permethrin (ELIMITE) 5 % cream APPLY FROM HEAD TO TOE, LEAVE ON 8-14 HOURS, THEN RINSE OFF. CAN REPEAT IN 1 WEEK IF NEEDED.     • " triamcinolone (KENALOG) 0.1 % cream Apply topically     • [DISCONTINUED] albuterol (Ventolin HFA) 90 mcg/act inhaler Inhale 2 puffs every 6 (six) hours as needed for wheezing 18 g 5   • [DISCONTINUED] benzonatate (TESSALON PERLES) 100 mg capsule Take 1 capsule (100 mg total) by mouth 3 (three) times a day as needed for cough 20 capsule 0     No current facility-administered medications on file prior to visit.

## 2025-05-14 NOTE — PROGRESS NOTES
Name: Tae Roblero      : 1980      MRN: [id number]  Encounter Provider: Subha Clay PA-C  Encounter Date: 2025   Encounter department: [facility] [place]  :  Assessment & Plan  Screening for colon cancer    Patient presents to schedule a screening colonoscopy (first one). No family history of colon cancer.    Will plan for colonoscopy to investigate.    Orders:    Colonoscopy; Future        History of Present Illness   HPI  Tae Roblero is a 45 y.o. male who presents to the office to schedule a screening colonoscopy (first one).  No family history of colon cancer.  No rectal bleeding, changes in his bowel movements, or abdominal pain.  He has undergone anesthesia in the past for his multiple back surgeries and reports he did well with anesthesia in the past.    I discussed informed consent with the patient for the colonoscopy. The risks/benefits of the procedure were discussed with the patient. Risks included, but not limited to, infection, bleeding, perforation were discussed. Patient was agreeable.   History obtained from: patient    Review of Systems   Constitutional: Negative.    HENT: Negative.     Eyes: Negative.    Respiratory: Negative.     Cardiovascular: Negative.    Gastrointestinal: Negative.    Endocrine: Negative.    Genitourinary: Negative.    Musculoskeletal: Negative.    Skin: Negative.    Neurological: Negative.    Hematological: Negative.    Psychiatric/Behavioral: Negative.       Medical History Reviewed by provider this encounter:  Meds     .  Past Medical History   Past Medical History:   Diagnosis Date    Allergic 1999    Cats    Low back pain     Lumbar disc herniation 2019    Added automatically from request for surgery [id number]    Lumbar radiculopathy 2018    Lumbar spondylosis 02/15/2019    Lumbosacral disc disease     Night terrors, adult     Recurrent displacement of lumbar disc 11/15/2019    Shingles  02/29/2004    Status post lumbar discectomy 11/06/2018     Past Surgical History:   Procedure Laterality Date    LUMBAR SPINE SURGERY  10/07/2015    [facility]. [facility]    [place] VAZQUEZ FACETECTOMY & FORAMOTOMY 1 VRT [facility] LUMBAR Left 03/12/2019    Procedure: L5-S1 MINIMALLY INVASIVE LAMINAL FORAMINOTOMY AND POSSIBLE MICRODISCECTOMY WITH POSSIBLE EPIDURAL STEROID INJECTION;  Surgeon: Franklin Tijerina MD;  [facility];  Service: Neurosurgery    IN LAMOT PRTL FFD EXC DISC REEXPL 1 [facility] LUMBAR Left 12/11/2019    Procedure: reopening of lumbar incision for minimally invasive left L5-S1 microdiskectomy and epidural steroid injection;  Surgeon: Franklin Tijerina MD;  [facility];  Service: Neurosurgery    SPINE SURGERY  3 disc ectomies     Family History   Problem Relation Age of Onset    Lumbar disc disease Father     Asthma Father     Cancer Mother       reports that he has never smoked. He has never been exposed to tobacco smoke. He has never used smokeless tobacco. He reports current alcohol use of about 8.0 standard drinks of alcohol per week. He reports that he does not use drugs.  Current Outpatient Medications   Medication Instructions    Cholecalciferol (Vitamin D3) 1.25 MG (32395 UT) CAPS 2 times a week for 12 weeks.    fluticasone (FLONASE) 50 mcg/act nasal spray SPRAY 1 SPRAY INTO EACH NOSTRIL EVERY DAY    multivitamin (THERAGRAN) TABS 1 tablet, Daily    permethrin (ELIMITE) 5 % cream APPLY FROM HEAD TO TOE, LEAVE ON 8-14 HOURS, THEN RINSE OFF. CAN REPEAT IN 1 WEEK IF NEEDED.    triamcinolone (KENALOG) 0.1 % cream Topical   Allergies[1]   Medications Ordered Prior to Encounter[2]   Social History     Tobacco Use    Smoking status: Never     Passive exposure: Never    Smokeless tobacco: Never   Vaping Use    Vaping status: Never Used   Substance and Sexual Activity    Alcohol use: Yes     Alcohol/week: 8.0 standard drinks of alcohol     Types: 4 Glasses of wine, 4 Cans of beer per week    Drug use:  "No    Sexual activity: Yes     Partners: Female     Birth control/protection: Male Sterilization        Objective   /80   Pulse 67   Ht 5' 10.5\" (1.791 m)   Wt 90.3 kg (199 lb)   SpO2 98%   BMI 28.15 kg/m²      Physical Exam  Constitutional:       General: He is not in acute distress.     Appearance: Normal appearance. He is not ill-appearing.   HENT:      Head: Normocephalic and atraumatic.     Cardiovascular:      Rate and Rhythm: Normal rate and regular rhythm.   Pulmonary:      Effort: Pulmonary effort is normal.      Breath sounds: Normal breath sounds.     Skin:     General: Skin is warm and dry.     Neurological:      Mental Status: He is alert and oriented to person, place, and time.     Psychiatric:         Mood and Affect: Mood normal.         Behavior: Behavior normal.                [1] No Known Allergies  [2]   Current Outpatient Medications on File Prior to Visit   Medication Sig Dispense Refill    Cholecalciferol (Vitamin D3) 1.25 MG (55519 UT) CAPS 2 times a week for 12 weeks. 24 capsule 0    fluticasone (FLONASE) 50 mcg/act nasal spray SPRAY 1 SPRAY INTO EACH NOSTRIL EVERY DAY 48 mL 1    multivitamin (THERAGRAN) TABS Take 1 tablet by mouth in the morning.      permethrin (ELIMITE) 5 % cream APPLY FROM HEAD TO TOE, LEAVE ON 8-14 HOURS, THEN RINSE OFF. CAN REPEAT IN 1 WEEK IF NEEDED.      triamcinolone (KENALOG) 0.1 % cream Apply topically      [DISCONTINUED] albuterol (Ventolin HFA) 90 mcg/act inhaler Inhale 2 puffs every 6 (six) hours as needed for wheezing 18 g 5    [DISCONTINUED] benzonatate (TESSALON PERLES) 100 mg capsule Take 1 capsule (100 mg total) by mouth 3 (three) times a day as needed for cough 20 capsule 0     No current facility-administered medications on file prior to visit.     "

## 2025-05-19 ENCOUNTER — APPOINTMENT (OUTPATIENT)
Age: 45
End: 2025-05-19
Payer: COMMERCIAL

## 2025-05-19 DIAGNOSIS — E78.2 MIXED HYPERLIPIDEMIA: ICD-10-CM

## 2025-05-19 DIAGNOSIS — Z13.6 ENCOUNTER FOR LIPID SCREENING FOR CARDIOVASCULAR DISEASE: ICD-10-CM

## 2025-05-19 DIAGNOSIS — Z13.220 ENCOUNTER FOR LIPID SCREENING FOR CARDIOVASCULAR DISEASE: ICD-10-CM

## 2025-05-19 DIAGNOSIS — E55.9 VITAMIN D INSUFFICIENCY: ICD-10-CM

## 2025-05-19 LAB
25(OH)D3 SERPL-MCNC: 45.4 NG/ML (ref 30–100)
ALBUMIN SERPL BCG-MCNC: 4.6 G/DL (ref 3.5–5)
ALP SERPL-CCNC: 55 U/L (ref 34–104)
ALT SERPL W P-5'-P-CCNC: 26 U/L (ref 7–52)
ANION GAP SERPL CALCULATED.3IONS-SCNC: 8 MMOL/L (ref 4–13)
AST SERPL W P-5'-P-CCNC: 24 U/L (ref 13–39)
BASOPHILS # BLD AUTO: 0.04 THOUSANDS/ÂΜL (ref 0–0.1)
BASOPHILS NFR BLD AUTO: 1 % (ref 0–1)
BILIRUB SERPL-MCNC: 0.48 MG/DL (ref 0.2–1)
BUN SERPL-MCNC: 18 MG/DL (ref 5–25)
CALCIUM SERPL-MCNC: 9.3 MG/DL (ref 8.4–10.2)
CHLORIDE SERPL-SCNC: 104 MMOL/L (ref 96–108)
CHOLEST SERPL-MCNC: 182 MG/DL (ref ?–200)
CO2 SERPL-SCNC: 27 MMOL/L (ref 21–32)
CREAT SERPL-MCNC: 1.02 MG/DL (ref 0.6–1.3)
EOSINOPHIL # BLD AUTO: 0.04 THOUSAND/ÂΜL (ref 0–0.61)
EOSINOPHIL NFR BLD AUTO: 1 % (ref 0–6)
ERYTHROCYTE [DISTWIDTH] IN BLOOD BY AUTOMATED COUNT: 12 % (ref 11.6–15.1)
GFR SERPL CREATININE-BSD FRML MDRD: 88 ML/MIN/1.73SQ M
GLUCOSE P FAST SERPL-MCNC: 108 MG/DL (ref 65–99)
HCT VFR BLD AUTO: 47 % (ref 36.5–49.3)
HDLC SERPL-MCNC: 55 MG/DL
HGB BLD-MCNC: 15.8 G/DL (ref 12–17)
IMM GRANULOCYTES # BLD AUTO: 0.01 THOUSAND/UL (ref 0–0.2)
IMM GRANULOCYTES NFR BLD AUTO: 0 % (ref 0–2)
LDLC SERPL CALC-MCNC: 109 MG/DL (ref 0–100)
LYMPHOCYTES # BLD AUTO: 2.94 THOUSANDS/ÂΜL (ref 0.6–4.47)
LYMPHOCYTES NFR BLD AUTO: 46 % (ref 14–44)
MCH RBC QN AUTO: 30.5 PG (ref 26.8–34.3)
MCHC RBC AUTO-ENTMCNC: 33.6 G/DL (ref 31.4–37.4)
MCV RBC AUTO: 91 FL (ref 82–98)
MONOCYTES # BLD AUTO: 0.42 THOUSAND/ÂΜL (ref 0.17–1.22)
MONOCYTES NFR BLD AUTO: 7 % (ref 4–12)
NEUTROPHILS # BLD AUTO: 2.85 THOUSANDS/ÂΜL (ref 1.85–7.62)
NEUTS SEG NFR BLD AUTO: 45 % (ref 43–75)
NRBC BLD AUTO-RTO: 0 /100 WBCS
PLATELET # BLD AUTO: 223 THOUSANDS/UL (ref 149–390)
PMV BLD AUTO: 11.8 FL (ref 8.9–12.7)
POTASSIUM SERPL-SCNC: 4.2 MMOL/L (ref 3.5–5.3)
PROT SERPL-MCNC: 7 G/DL (ref 6.4–8.4)
RBC # BLD AUTO: 5.18 MILLION/UL (ref 3.88–5.62)
SODIUM SERPL-SCNC: 139 MMOL/L (ref 135–147)
TRIGL SERPL-MCNC: 88 MG/DL (ref ?–150)
TSH SERPL DL<=0.05 MIU/L-ACNC: 2.13 UIU/ML (ref 0.45–4.5)
WBC # BLD AUTO: 6.3 THOUSAND/UL (ref 4.31–10.16)

## 2025-05-19 PROCEDURE — 83695 ASSAY OF LIPOPROTEIN(A): CPT

## 2025-05-19 PROCEDURE — 82172 ASSAY OF APOLIPOPROTEIN: CPT

## 2025-05-19 PROCEDURE — 85025 COMPLETE CBC W/AUTO DIFF WBC: CPT

## 2025-05-19 PROCEDURE — 80053 COMPREHEN METABOLIC PANEL: CPT

## 2025-05-19 PROCEDURE — 82306 VITAMIN D 25 HYDROXY: CPT

## 2025-05-19 PROCEDURE — 84443 ASSAY THYROID STIM HORMONE: CPT

## 2025-05-19 PROCEDURE — 80061 LIPID PANEL: CPT

## 2025-05-19 PROCEDURE — 36415 COLL VENOUS BLD VENIPUNCTURE: CPT

## 2025-05-20 LAB — LPA SERPL-SCNC: 22.4 NMOL/L

## 2025-05-21 LAB — APO B SERPL-MCNC: 90 MG/DL

## 2025-05-28 ENCOUNTER — TELEPHONE (OUTPATIENT)
Age: 45
End: 2025-05-28

## 2025-05-28 NOTE — TELEPHONE ENCOUNTER
Patient calling to reschedule his colonoscopy    Scheduled date of colonoscopy (as of today): 6/18/25  Physician performing colonoscopy:   Location of colonoscopy: [place]  Bowel prep reviewed with patient: Miralax Dulcolax

## 2025-06-02 ENCOUNTER — APPOINTMENT (OUTPATIENT)
Age: 45
End: 2025-06-02
Payer: COMMERCIAL

## 2025-06-02 ENCOUNTER — OFFICE VISIT (OUTPATIENT)
Age: 45
End: 2025-06-02
Payer: COMMERCIAL

## 2025-06-02 VITALS
DIASTOLIC BLOOD PRESSURE: 72 MMHG | HEIGHT: 71 IN | SYSTOLIC BLOOD PRESSURE: 120 MMHG | OXYGEN SATURATION: 97 % | TEMPERATURE: 97.8 F | WEIGHT: 198.2 LBS | HEART RATE: 90 BPM | BODY MASS INDEX: 27.75 KG/M2

## 2025-06-02 DIAGNOSIS — J30.81 CAT ALLERGIES: ICD-10-CM

## 2025-06-02 DIAGNOSIS — M25.50 MULTIPLE JOINT PAIN: ICD-10-CM

## 2025-06-02 DIAGNOSIS — Z12.5 PROSTATE CANCER SCREENING: ICD-10-CM

## 2025-06-02 DIAGNOSIS — J30.9 CHRONIC ALLERGIC RHINITIS: ICD-10-CM

## 2025-06-02 DIAGNOSIS — Z00.00 ANNUAL PHYSICAL EXAM: ICD-10-CM

## 2025-06-02 DIAGNOSIS — N18.2 CKD (CHRONIC KIDNEY DISEASE) STAGE 2, GFR 60-89 ML/MIN: ICD-10-CM

## 2025-06-02 DIAGNOSIS — E78.2 MIXED HYPERLIPIDEMIA: Primary | ICD-10-CM

## 2025-06-02 DIAGNOSIS — R73.9 ELEVATED SERUM GLUCOSE: ICD-10-CM

## 2025-06-02 DIAGNOSIS — E55.9 VITAMIN D DEFICIENCY: ICD-10-CM

## 2025-06-02 PROCEDURE — 99214 OFFICE O/P EST MOD 30 MIN: CPT | Performed by: FAMILY MEDICINE

## 2025-06-02 PROCEDURE — G0103 PSA SCREENING: HCPCS

## 2025-06-02 PROCEDURE — 83036 HEMOGLOBIN GLYCOSYLATED A1C: CPT

## 2025-06-02 PROCEDURE — 86618 LYME DISEASE ANTIBODY: CPT

## 2025-06-02 PROCEDURE — 86225 DNA ANTIBODY NATIVE: CPT

## 2025-06-02 PROCEDURE — 99396 PREV VISIT EST AGE 40-64: CPT | Performed by: FAMILY MEDICINE

## 2025-06-02 PROCEDURE — 36415 COLL VENOUS BLD VENIPUNCTURE: CPT

## 2025-06-02 PROCEDURE — 86038 ANTINUCLEAR ANTIBODIES: CPT

## 2025-06-02 RX ORDER — FLUTICASONE PROPIONATE 50 MCG
1 SPRAY, SUSPENSION (ML) NASAL DAILY
Qty: 48 ML | Refills: 3 | Status: SHIPPED | OUTPATIENT
Start: 2025-06-02

## 2025-06-02 NOTE — PROGRESS NOTES
Adult Annual Physical  Name: Tae Roblero      : 1980      MRN: [id number]  Encounter Provider: Sunday Connolly MD  Encounter Date: 2025   Encounter department: [facility]    :  Assessment & Plan  Mixed hyperlipidemia  2025 Lipoprotein a measurement: 22.4   2025 Reviewed & discussed labs today.   Currently not prescribed any meds.   Assessment&Plan: Improving  Med changes: None. Continue to focus on lifestyle improvement only.   Lifestyle modifications advised  Including improving nutritional intake and exercising regularly, really focusing on building good habits as discussed.  Goal of <60 Apolipoprotein B & LDL, <100 triglycerides, and >60 HDL  Monitor lipid panel annually         CKD (chronic kidney disease) stage 2, GFR 60-89 ml/min  Lab Results   Component Value Date    EGFR 88 2025    EGFR 100 2024    EGFR 80 2024    CREATININE 1.02 2025    CREATININE 0.92 2024    CREATININE 1.11 2024     Labs reviewed & discussed   Assessment: Fluctuating between 80s and 100.  More likely stage I chronic kidney disease  Improve lifestyle and dietary changes to slow its progression as discussed  Low-sodium, low-carb diet, limit snacking, exercise regularly.  Avoid nephrotoxic agents including over-the-counter NSAIDs as discussed  Hydrate well with water.          BMI 28.0-28.9,adult         Vitamin D deficiency  Reviewed & Discussed labs  Optimal vitamin D levels  Goal , preventative therapy   Take vitamin D3 5,000 units-vitamin K2 200 mcg combination pill daily with food  Follow-up with vitamin D blood work annually           Annual physical exam  Healthcare Maintenance  Health maintenance completed today.  - Medical history reviewed, including existing medical conditions, medications, and surgeries.   - Labs discussed to evaluate cholesterol, blood sugar, kidney function, liver function, and other important markers of  health.  - BMI evaluated and discussed.  - Lifestyle and health counseling completed including diet, exercise habits, smoking status, alcohol consumption.   - Bone & Heart health reviewed  - Cancer screenings discussed: CT lung/prostate/colonoscopy.   - Vaccination status reviewed and pertinent immunizations and booster shots discussed.  - Skin examination: Discussed importance of sunscreen and other preventative measures for skin cancer.  - Mental health and wellbeing evaluated and discussed.  - Family history obtained to identify any of hereditary health risks.   Lab orders in place as discussed  Start/continue preventative measures as discussed/advised  Complete preventative orders in place as recommended.   Refer to screenings problem list        Multiple joint pain  Reestablish recent onset of multiple joint pain that is migrating in nature.  It has been a week now.  It started with low-grade fever for 3 days.  More likely viral infection but will evaluate further including testing for Lyme.  Placed order for Lyme and start of evaluation for autoimmune.  Orders:  •  Lyme Total AB W Reflex to IGM/IGG; Future  •  HÉCTOR Screen w/Reflex Cascade; Future    Prostate cancer screening    Orders:  •  PSA, Total Screen; Future    Cat allergies  Chronic allergic rhinitis  Referral placed for allergist to consider injection    Orders:  •  Ambulatory Referral to Allergy; Future  •  fluticasone (FLONASE) 50 mcg/act nasal spray; 1 spray into each nostril daily    Elevated serum glucose    Orders:  •  Hemoglobin A1C; Future        Preventive Screenings:  - Diabetes Screening: screening up-to-date  - Cholesterol Screening: screening not indicated and has hyperlipidemia   - Hepatitis C screening: screening up-to-date   - HIV screening: screening up-to-date   - Colon cancer screening: risks/benefits discussed   - Lung cancer screening: screening not indicated   - Prostate cancer screening: risks/benefits discussed and orders placed  "      Depression Screening and Follow-up Plan: Patient was screened for depression during today's encounter. They screened negative with a PHQ-2 score of 0.          History of Present Illness     Adult Annual Physical:  Patient presents for annual physical.     Diet and Physical Activity:  - Diet/Nutrition: well balanced diet and intermittent fasting.  - Exercise: walking, moderate cardiovascular exercise, vigorous cardiovascular exercise, strength training exercises, 3-4 times a week on average and 30-60 minutes on average.    Depression Screening:  - PHQ-2 Score: 0    General Health:  - Sleep: sleeps well and 7-8 hours of sleep on average. Flonase is helping with sleep issues. Continued script requested.  - Hearing: normal hearing bilateral ears.  - Vision: wears glasses and contacts and most recent eye exam < 1 year ago.  - Dental: regular dental visits, brushes teeth twice daily and floss regularly.    /GYN Health:  - Follows with GYN: no.   - History of STDs: no     Health:  - History of STDs: no.   - Urinary symptoms: none.     Advanced Care Planning:  - Has an advanced directive?: yes    - Has a durable medical POA?: yes      Review of Systems   Respiratory:  Negative for chest tightness and shortness of breath.    Cardiovascular:  Negative for chest pain.   Gastrointestinal:  Negative for abdominal pain, blood in stool, constipation, diarrhea, nausea and vomiting.   Endocrine: Negative for polydipsia and polyuria.   Genitourinary:  Negative for dysuria and hematuria.   Musculoskeletal:  Positive for arthralgias and joint swelling.   Neurological:  Negative for dizziness, weakness, light-headedness and headaches.         Objective   /72 (BP Location: Left arm, Patient Position: Sitting, Cuff Size: Large)   Pulse 90   Temp 97.8 °F (36.6 °C) (Tympanic)   Ht 5' 10.5\" (1.791 m)   Wt 89.9 kg (198 lb 3.2 oz)   SpO2 97%   BMI 28.04 kg/m²     Physical Exam  Vitals reviewed.   Constitutional:       " General: He is not in acute distress.     Appearance: Normal appearance. He is not ill-appearing, toxic-appearing or diaphoretic.   HENT:      Head: Normocephalic and atraumatic.      Right Ear: External ear normal.      Left Ear: External ear normal.      Nose: Nose normal.      Mouth/Throat:      Mouth: Mucous membranes are moist.     Eyes:      General: No scleral icterus.        Right eye: No discharge.         Left eye: No discharge.      Extraocular Movements: Extraocular movements intact.      Conjunctiva/sclera: Conjunctivae normal.       Cardiovascular:      Rate and Rhythm: Normal rate and regular rhythm.      Pulses: Normal pulses.      Heart sounds: Normal heart sounds.   Pulmonary:      Effort: Pulmonary effort is normal. No respiratory distress.      Breath sounds: Normal breath sounds.   Abdominal:      Palpations: Abdomen is soft.      Tenderness: There is no abdominal tenderness.     Musculoskeletal:         General: No swelling. Normal range of motion.      Cervical back: Normal range of motion.     Skin:     General: Skin is warm and dry.     Neurological:      General: No focal deficit present.      Mental Status: He is alert and oriented to person, place, and time.     Psychiatric:         Mood and Affect: Mood normal.         Behavior: Behavior normal.         Thought Content: Thought content normal.

## 2025-06-02 NOTE — ASSESSMENT & PLAN NOTE
Reviewed & Discussed labs  Optimal vitamin D levels  Goal , preventative therapy   Take vitamin D3 5,000 units-vitamin K2 200 mcg combination pill daily with food  Follow-up with vitamin D blood work annually

## 2025-06-02 NOTE — ASSESSMENT & PLAN NOTE
Lab Results   Component Value Date    EGFR 88 05/19/2025    EGFR 100 11/14/2024    EGFR 80 05/02/2024    CREATININE 1.02 05/19/2025    CREATININE 0.92 11/14/2024    CREATININE 1.11 05/02/2024     Labs reviewed & discussed   Assessment: Fluctuating between 80s and 100.  More likely stage I chronic kidney disease  Improve lifestyle and dietary changes to slow its progression as discussed  Low-sodium, low-carb diet, limit snacking, exercise regularly.  Avoid nephrotoxic agents including over-the-counter NSAIDs as discussed  Hydrate well with water.

## 2025-06-02 NOTE — ASSESSMENT & PLAN NOTE
05/19/2025 Lipoprotein a measurement: 22.4   05/19/2025 Reviewed & discussed labs today.   Currently not prescribed any meds.   Assessment&Plan: Improving  Med changes: None. Continue to focus on lifestyle improvement only.   Lifestyle modifications advised  Including improving nutritional intake and exercising regularly, really focusing on building good habits as discussed.  Goal of <60 Apolipoprotein B & LDL, <100 triglycerides, and >60 HDL  Monitor lipid panel annually

## 2025-06-03 ENCOUNTER — RESULTS FOLLOW-UP (OUTPATIENT)
Age: 45
End: 2025-06-03

## 2025-06-03 DIAGNOSIS — R73.03 PREDIABETES: Primary | ICD-10-CM

## 2025-06-03 LAB
B BURGDOR IGG+IGM SER QL IA: NEGATIVE
DSDNA IGG SERPL IA-ACNC: 2.8 IU/ML (ref ?–15)
EST. AVERAGE GLUCOSE BLD GHB EST-MCNC: 140 MG/DL
HBA1C MFR BLD: 6.5 %
NUCLEAR IGG SER IA-RTO: 0.5 RATIO (ref ?–1)
PSA SERPL-MCNC: 0.42 NG/ML (ref 0–4)

## 2025-06-04 NOTE — TELEPHONE ENCOUNTER
----- Message from Sunday Connolly MD sent at 6/3/2025 12:50 PM EDT -----  Please call patient to schedule III month follow-up for new onset of diabetes.  Please schedule second week of September or later  ----- Message -----  From: Lab, Background User  Sent: 6/3/2025  12:17 AM EDT  To: Sunday Connolly MD

## 2025-06-05 NOTE — TELEPHONE ENCOUNTER
Re: Joint angeli/schedule same day appt     Provider's message relayed in full detail    Left voice mail asking pt to return call w/any questions.

## 2025-06-05 NOTE — TELEPHONE ENCOUNTER
----- Message from Sunday Connolly MD sent at 6/5/2025  9:22 AM EDT -----  Monitor for now and make same day appointment on day it is swollen  ----- Message -----  From: Maria Esther Latif MA  Sent: 6/4/2025   9:39 AM EDT  To: Sunday Connolly MD    ----- Message from Maria Esther Latif MA sent at 6/4/2025  9:39 AM EDT -----    Patient would like to know what should be the next step for his joint pain?   Scheduled appointment for patient on Friday, 09/12/25 for new on set of diabetes appointment.  ----- Message -----  From: Sunday Connolly MD  Sent: 6/3/2025  12:50 PM EDT  To: [facility]    Please call patient to schedule III month follow-up for new onset of diabetes.  Please schedule second week of September or later  ----- Message -----  From: Lab, Background User  Sent: 6/3/2025  12:17 AM EDT  To: Sunday Connolly MD

## 2025-06-18 ENCOUNTER — ANESTHESIA (OUTPATIENT)
Dept: GASTROENTEROLOGY | Facility: HOSPITAL | Age: 45
End: 2025-06-18
Payer: COMMERCIAL

## 2025-06-18 ENCOUNTER — ANESTHESIA EVENT (OUTPATIENT)
Dept: GASTROENTEROLOGY | Facility: HOSPITAL | Age: 45
End: 2025-06-18
Payer: COMMERCIAL

## 2025-06-18 ENCOUNTER — HOSPITAL ENCOUNTER (OUTPATIENT)
Dept: GASTROENTEROLOGY | Facility: HOSPITAL | Age: 45
Setting detail: OUTPATIENT SURGERY
Discharge: HOME | End: 2025-06-18
Attending: PHYSICIAN ASSISTANT
Payer: COMMERCIAL

## 2025-06-18 VITALS
DIASTOLIC BLOOD PRESSURE: 67 MMHG | SYSTOLIC BLOOD PRESSURE: 98 MMHG | BODY MASS INDEX: 28.22 KG/M2 | TEMPERATURE: 97 F | WEIGHT: 197.09 LBS | HEIGHT: 70 IN | RESPIRATION RATE: 23 BRPM | HEART RATE: 75 BPM | OXYGEN SATURATION: 95 %

## 2025-06-18 DIAGNOSIS — Z12.11 SCREENING FOR COLON CANCER: ICD-10-CM

## 2025-06-18 PROCEDURE — 45385 COLONOSCOPY W/LESION REMOVAL: CPT | Performed by: INTERNAL MEDICINE

## 2025-06-18 PROCEDURE — 45380 COLONOSCOPY AND BIOPSY: CPT | Performed by: INTERNAL MEDICINE

## 2025-06-18 PROCEDURE — 88305 TISSUE EXAM BY PATHOLOGIST: CPT | Performed by: PATHOLOGY

## 2025-06-18 RX ORDER — PROPOFOL 10 MG/ML
INJECTION, EMULSION INTRAVENOUS AS NEEDED
Status: DISCONTINUED | OUTPATIENT
Start: 2025-06-18 | End: 2025-06-18

## 2025-06-18 RX ORDER — SODIUM CHLORIDE, SODIUM LACTATE, POTASSIUM CHLORIDE, CALCIUM CHLORIDE 600; 310; 30; 20 MG/100ML; MG/100ML; MG/100ML; MG/100ML
INJECTION, SOLUTION INTRAVENOUS CONTINUOUS PRN
Status: DISCONTINUED | OUTPATIENT
Start: 2025-06-18 | End: 2025-06-18

## 2025-06-18 RX ORDER — LIDOCAINE HYDROCHLORIDE 10 MG/ML
INJECTION, SOLUTION EPIDURAL; INFILTRATION; INTRACAUDAL; PERINEURAL AS NEEDED
Status: DISCONTINUED | OUTPATIENT
Start: 2025-06-18 | End: 2025-06-18

## 2025-06-18 RX ADMIN — PROPOFOL 40 MG: 10 INJECTION, EMULSION INTRAVENOUS at 08:17

## 2025-06-18 RX ADMIN — PROPOFOL 40 MG: 10 INJECTION, EMULSION INTRAVENOUS at 08:12

## 2025-06-18 RX ADMIN — PROPOFOL 140 MG: 10 INJECTION, EMULSION INTRAVENOUS at 08:04

## 2025-06-18 RX ADMIN — PROPOFOL 40 MG: 10 INJECTION, EMULSION INTRAVENOUS at 08:08

## 2025-06-18 RX ADMIN — SODIUM CHLORIDE, SODIUM LACTATE, POTASSIUM CHLORIDE, AND CALCIUM CHLORIDE: .6; .31; .03; .02 INJECTION, SOLUTION INTRAVENOUS at 08:02

## 2025-06-18 RX ADMIN — PROPOFOL 40 MG: 10 INJECTION, EMULSION INTRAVENOUS at 08:06

## 2025-06-18 RX ADMIN — LIDOCAINE HYDROCHLORIDE 50 MG: 10 INJECTION, SOLUTION EPIDURAL; INFILTRATION; INTRACAUDAL at 08:04

## 2025-06-18 NOTE — ANESTHESIA POSTPROCEDURE EVALUATION
Post-Op Assessment Note    CV Status:  Stable  Pain Score: 0    Pain management: adequate       Mental Status:  Arousable and sleepy   Hydration Status:  Euvolemic   PONV Controlled:  Controlled   Airway Patency:  Patent     Post Op Vitals Reviewed: Yes    No anethesia notable event occurred.    Staff: CRNA           Last Filed PACU Vitals:  Vitals Value Taken Time   Temp 97.2 °F (36.2 °C) 06/18/25 08:23   Pulse 71 06/18/25 08:23   /75 06/18/25 08:23   Resp 22 06/18/25 08:23   SpO2 96 % 06/18/25 08:23

## 2025-06-18 NOTE — H&P
History and Physical - [facility]  Tae Roblero 45 y.o. male MRN: [id number]                  HPI: Tae Roblero is a 45 y.o. year old male who presents for colonoscopy for average risk screening.  No prior colonoscopy      REVIEW OF SYSTEMS: Per the HPI, and otherwise unremarkable.    Historical Information   Past Medical History[1]  Past Surgical History[2]  Social History   Social History     Substance and Sexual Activity   Alcohol Use Yes    Alcohol/week: 8.0 standard drinks of alcohol    Types: 4 Glasses of wine, 4 Cans of beer per week     Social History     Substance and Sexual Activity   Drug Use No     Tobacco Use History[3]  Family History[4]    Meds/Allergies     Not in a hospital admission.    Allergies[5]    Objective     There were no vitals taken for this visit.      PHYSICAL EXAM    Gen: NAD  CV: RRR  CHEST: Clear  ABD: soft, NT/ND  EXT: no edema  Neuro: AAO      ASSESSMENT/PLAN:  This is a 45 y.o. year old male here for average risk screening    PLAN:   Procedure: Colonoscopy               [1]   Past Medical History:  Diagnosis Date    Allergic 01/01/1999    Cats    Low back pain     Lumbar disc herniation 11/19/2019    Added automatically from request for surgery [id number]    Lumbar radiculopathy 12/04/2018    Lumbar spondylosis 02/15/2019    Lumbosacral disc disease     Night terrors, adult     Recurrent displacement of lumbar disc 11/15/2019    Shingles 02/29/2004    Status post lumbar discectomy 11/06/2018   [2]   Past Surgical History:  Procedure Laterality Date    LUMBAR SPINE SURGERY  10/07/2015    Dr. [facility]    WA VAZQUEZ FACETECTOMY & FORAMOTOMY 1 VRT SGM LUMBAR Left 03/12/2019    Procedure: L5-S1 MINIMALLY INVASIVE LAMINAL FORAMINOTOMY AND POSSIBLE MICRODISCECTOMY WITH POSSIBLE EPIDURAL STEROID INJECTION;  Surgeon: Franklin Tijerina MD;  Location: [facility];  Service: Neurosurgery    WA LAMOT PRTL FFD EXC DISC REEXPL 1 NTRSP LUMBAR Left 12/11/2019     Procedure: reopening of lumbar incision for minimally invasive left L5-S1 microdiskectomy and epidural steroid injection;  Surgeon: Franklin Tijerina MD;  Location: [facility];  Service: Neurosurgery    SPINE SURGERY  3 disc ectomies   [3]   Social History  Tobacco Use   Smoking Status Never    Passive exposure: Never   Smokeless Tobacco Never   [4]   Family History  Problem Relation Name Age of Onset    Lumbar disc disease Father Loco Roblero     Asthma Father Loco Roblero     Cancer Mother Candace Osbaldo    [5] No Known Allergies

## 2025-06-18 NOTE — ANESTHESIA PREPROCEDURE EVALUATION
Procedure:  COLONOSCOPY    Relevant Problems   CARDIO   (+) Bilateral varicoceles   (+) Mixed hyperlipidemia      /RENAL   (+) CKD (chronic kidney disease) stage 2, GFR 60-89 ml/min      MUSCULOSKELETAL   (+) Low back pain        Physical Exam    Airway     Mallampati score: III  TM Distance: >3 FB  Neck ROM: full  Mouth opening: >= 4 cm      Cardiovascular  Cardiovascular exam normal    Dental   No notable dental hx     Pulmonary  Pulmonary exam normal     Neurological  - normal exam    Other Findings        Anesthesia Plan  ASA Score- 1     Anesthesia Type- IV sedation with anesthesia with ASA Monitors.         Additional Monitors:     Airway Plan: natural airway.           Plan Factors-Exercise tolerance (METS): >4 METS.    Chart reviewed.    Patient summary reviewed.    Patient is not a current smoker.              Induction- intravenous.    Postoperative Plan- .   Monitoring Plan - Monitoring plan - standard ASA monitoring          Informed Consent- Anesthetic plan and risks discussed with patient.        NPO Status:  Vitals Value Taken Time   Date of last liquid 06/17/25 06/18/25 07:14   Time of last liquid 2200 06/18/25 07:14   Date of last solid 06/16/25 06/18/25 07:14   Time of last solid 1800 06/18/25 07:14

## 2025-06-18 NOTE — INTERVAL H&P NOTE
H&P reviewed. After examining the patient I find no changes in the patients condition since the H&P had been written.    Vitals:    06/18/25 0713   BP: 128/83   Pulse: 80   Resp: 16   Temp: 98.1 °F (36.7 °C)   SpO2: 98%

## 2025-06-23 PROCEDURE — 88305 TISSUE EXAM BY PATHOLOGIST: CPT | Performed by: PATHOLOGY

## (undated) DEVICE — BETHLEHEM UNIVERSAL SPINE, KIT: Brand: CARDINAL HEALTH

## (undated) DEVICE — FLOSEAL HEMOSTATIC MATRIX, 5 ML: Brand: FLOSEAL

## (undated) DEVICE — DRESSING MEPILEX AG BORDER 4 X 4 IN

## (undated) DEVICE — SPONGE PVP SCRUB WING STERILE

## (undated) DEVICE — INTENDED FOR TISSUE SEPARATION, AND OTHER PROCEDURES THAT REQUIRE A SHARP SURGICAL BLADE TO PUNCTURE OR CUT.: Brand: BARD-PARKER ® CARBON RIB-BACK BLADES

## (undated) DEVICE — PREP SURGICAL PURPREP 26ML

## (undated) DEVICE — SUT VICRYL 0 UR-6 27 IN J603H

## (undated) DEVICE — DRAPE EQUIPMENT RF WAND

## (undated) DEVICE — SYRINGE 10ML LL

## (undated) DEVICE — ELECTRODE BLADE MOD  E-Z CLEAN 6.5IN -0014M

## (undated) DEVICE — MINOR PROCEDURE DRAPE: Brand: CONVERTORS

## (undated) DEVICE — TOOL 14MH30 LEGEND 14CM 3MM: Brand: MIDAS REX ™

## (undated) DEVICE — GLOVE SRG BIOGEL 7.5

## (undated) DEVICE — GLOVE INDICATOR PI UNDERGLOVE SZ 7.5 BLUE

## (undated) DEVICE — PENCIL ELECTROSURG E-Z CLEAN -0035H

## (undated) DEVICE — TOOL 15BA50 LEGEND 15CM 5MM BA: Brand: MIDAS REX™

## (undated) DEVICE — DRAPE MICROSCOPE OPMI PENTERO

## (undated) DEVICE — SYRINGE 3ML LL

## (undated) DEVICE — SUT MONOCRYL 4-0 PS-2 27 IN Y426H

## (undated) DEVICE — INTENDED FOR TISSUE SEPARATION, AND OTHER PROCEDURES THAT REQUIRE A SHARP SURGICAL BLADE TO PUNCTURE OR CUT.: Brand: BARD-PARKER SAFETY BLADES SIZE 15, STERILE

## (undated) DEVICE — ANTIBACTERIAL VIOLET BRAIDED (POLYGLACTIN 910), SYNTHETIC ABSORBABLE SUTURE: Brand: COATED VICRYL

## (undated) DEVICE — SNAP KOVER: Brand: UNBRANDED

## (undated) DEVICE — Device

## (undated) DEVICE — HEMOSTATIC MATRIX SURGIFLO 8ML W/THROMBIN

## (undated) DEVICE — NEEDLE 18 G X 1 1/2 SAFETY

## (undated) DEVICE — THE FLOSEAL MALLEABLE TIP AND TRIMMABLE TIP ARE INTENDED FOR DELIVERY OF FLOSEAL HEMOSTATIC MATRIX.: Brand: FLOSEAL SPECIAL APPLICATOR TIPS